# Patient Record
Sex: FEMALE | Race: WHITE | Employment: FULL TIME | URBAN - METROPOLITAN AREA
[De-identification: names, ages, dates, MRNs, and addresses within clinical notes are randomized per-mention and may not be internally consistent; named-entity substitution may affect disease eponyms.]

---

## 2021-01-15 LAB
HEP B, EXTERNAL RESULT: NEGATIVE
HEPATITIS C ANTIBODY, EXTERNAL RESULT: NEGATIVE

## 2021-06-04 LAB
HIV, EXTERNAL RESULT: NORMAL
RPR, EXTERNAL RESULT: NORMAL
RUBELLA TITER, EXTERNAL RESULT: NORMAL

## 2021-07-30 LAB — GBS, EXTERNAL RESULT: NEGATIVE

## 2021-08-17 ENCOUNTER — HOSPITAL ENCOUNTER (INPATIENT)
Age: 26
LOS: 2 days | Discharge: HOME | End: 2021-08-19
Attending: OBSTETRICS & GYNECOLOGY | Admitting: OBSTETRICS & GYNECOLOGY
Payer: COMMERCIAL

## 2021-08-17 PROBLEM — O14.93 PRE-ECLAMPSIA IN THIRD TRIMESTER: Status: ACTIVE | Noted: 2021-08-17

## 2021-08-17 PROBLEM — Z34.90 ENCOUNTER FOR INDUCTION OF LABOR: Status: ACTIVE | Noted: 2021-08-17

## 2021-08-17 PROBLEM — Z3A.38 38 WEEKS GESTATION OF PREGNANCY: Status: ACTIVE | Noted: 2021-08-17

## 2021-08-17 PROBLEM — O09.93 SUPERVISION OF HIGH RISK PREGNANCY IN THIRD TRIMESTER: Status: ACTIVE | Noted: 2021-08-17

## 2021-08-17 LAB
A/G RATIO: 1.1 (ref 1.1–2.2)
ABO/RH: NORMAL
ALBUMIN SERPL-MCNC: 3.4 G/DL (ref 3.4–5)
ALP BLD-CCNC: 140 U/L (ref 40–129)
ALT SERPL-CCNC: 16 U/L (ref 10–40)
ANION GAP SERPL CALCULATED.3IONS-SCNC: 13 MMOL/L (ref 3–16)
ANTIBODY SCREEN: NORMAL
APTT: 26.4 SEC (ref 26.2–38.6)
AST SERPL-CCNC: 20 U/L (ref 15–37)
BASOPHILS ABSOLUTE: 0 K/UL (ref 0–0.2)
BASOPHILS RELATIVE PERCENT: 0.5 %
BILIRUB SERPL-MCNC: <0.2 MG/DL (ref 0–1)
BUN BLDV-MCNC: 8 MG/DL (ref 7–20)
CALCIUM SERPL-MCNC: 8.6 MG/DL (ref 8.3–10.6)
CHLORIDE BLD-SCNC: 105 MMOL/L (ref 99–110)
CO2: 20 MMOL/L (ref 21–32)
CREAT SERPL-MCNC: 0.5 MG/DL (ref 0.6–1.1)
CREATININE URINE: 13.6 MG/DL (ref 28–259)
EOSINOPHILS ABSOLUTE: 0.1 K/UL (ref 0–0.6)
EOSINOPHILS RELATIVE PERCENT: 0.7 %
GFR AFRICAN AMERICAN: >60
GFR NON-AFRICAN AMERICAN: >60
GLOBULIN: 3 G/DL
GLUCOSE BLD-MCNC: 73 MG/DL (ref 70–99)
HCT VFR BLD CALC: 31.7 % (ref 36–48)
HEMOGLOBIN: 10.5 G/DL (ref 12–16)
INR BLD: 0.85 (ref 0.88–1.12)
LYMPHOCYTES ABSOLUTE: 1.8 K/UL (ref 1–5.1)
LYMPHOCYTES RELATIVE PERCENT: 18.9 %
MCH RBC QN AUTO: 26.5 PG (ref 26–34)
MCHC RBC AUTO-ENTMCNC: 33.2 G/DL (ref 31–36)
MCV RBC AUTO: 80 FL (ref 80–100)
MONOCYTES ABSOLUTE: 0.9 K/UL (ref 0–1.3)
MONOCYTES RELATIVE PERCENT: 9 %
NEUTROPHILS ABSOLUTE: 6.8 K/UL (ref 1.7–7.7)
NEUTROPHILS RELATIVE PERCENT: 70.9 %
PDW BLD-RTO: 14 % (ref 12.4–15.4)
PLATELET # BLD: 235 K/UL (ref 135–450)
PMV BLD AUTO: 8.7 FL (ref 5–10.5)
POTASSIUM SERPL-SCNC: 4.2 MMOL/L (ref 3.5–5.1)
PROTEIN PROTEIN: 8 MG/DL
PROTEIN/CREAT RATIO: 0.6 MG/DL
PROTHROMBIN TIME: 9.5 SEC (ref 9.9–12.7)
RBC # BLD: 3.96 M/UL (ref 4–5.2)
SARS-COV-2, NAAT: NOT DETECTED
SODIUM BLD-SCNC: 138 MMOL/L (ref 136–145)
TOTAL PROTEIN: 6.4 G/DL (ref 6.4–8.2)
URIC ACID, SERUM: 4.5 MG/DL (ref 2.6–6)
WBC # BLD: 9.5 K/UL (ref 4–11)

## 2021-08-17 PROCEDURE — 86850 RBC ANTIBODY SCREEN: CPT

## 2021-08-17 PROCEDURE — 85610 PROTHROMBIN TIME: CPT

## 2021-08-17 PROCEDURE — 80053 COMPREHEN METABOLIC PANEL: CPT

## 2021-08-17 PROCEDURE — 85730 THROMBOPLASTIN TIME PARTIAL: CPT

## 2021-08-17 PROCEDURE — 82570 ASSAY OF URINE CREATININE: CPT

## 2021-08-17 PROCEDURE — 1220000000 HC SEMI PRIVATE OB R&B

## 2021-08-17 PROCEDURE — 59025 FETAL NON-STRESS TEST: CPT

## 2021-08-17 PROCEDURE — 84156 ASSAY OF PROTEIN URINE: CPT

## 2021-08-17 PROCEDURE — 85025 COMPLETE CBC W/AUTO DIFF WBC: CPT

## 2021-08-17 PROCEDURE — 84550 ASSAY OF BLOOD/URIC ACID: CPT

## 2021-08-17 PROCEDURE — 80307 DRUG TEST PRSMV CHEM ANLYZR: CPT

## 2021-08-17 PROCEDURE — 6360000002 HC RX W HCPCS: Performed by: OBSTETRICS & GYNECOLOGY

## 2021-08-17 PROCEDURE — 86900 BLOOD TYPING SEROLOGIC ABO: CPT

## 2021-08-17 PROCEDURE — 87635 SARS-COV-2 COVID-19 AMP PRB: CPT

## 2021-08-17 PROCEDURE — 2580000003 HC RX 258: Performed by: OBSTETRICS & GYNECOLOGY

## 2021-08-17 PROCEDURE — 86901 BLOOD TYPING SEROLOGIC RH(D): CPT

## 2021-08-17 RX ORDER — SODIUM CHLORIDE, SODIUM LACTATE, POTASSIUM CHLORIDE, AND CALCIUM CHLORIDE .6; .31; .03; .02 G/100ML; G/100ML; G/100ML; G/100ML
1000 INJECTION, SOLUTION INTRAVENOUS PRN
Status: DISCONTINUED | OUTPATIENT
Start: 2021-08-17 | End: 2021-08-18 | Stop reason: HOSPADM

## 2021-08-17 RX ORDER — SODIUM CHLORIDE, SODIUM LACTATE, POTASSIUM CHLORIDE, AND CALCIUM CHLORIDE .6; .31; .03; .02 G/100ML; G/100ML; G/100ML; G/100ML
500 INJECTION, SOLUTION INTRAVENOUS PRN
Status: DISCONTINUED | OUTPATIENT
Start: 2021-08-17 | End: 2021-08-18 | Stop reason: HOSPADM

## 2021-08-17 RX ORDER — SODIUM CHLORIDE 0.9 % (FLUSH) 0.9 %
5-40 SYRINGE (ML) INJECTION EVERY 12 HOURS SCHEDULED
Status: DISCONTINUED | OUTPATIENT
Start: 2021-08-17 | End: 2021-08-18 | Stop reason: HOSPADM

## 2021-08-17 RX ORDER — SODIUM CHLORIDE 9 MG/ML
25 INJECTION, SOLUTION INTRAVENOUS PRN
Status: DISCONTINUED | OUTPATIENT
Start: 2021-08-17 | End: 2021-08-18 | Stop reason: HOSPADM

## 2021-08-17 RX ORDER — SODIUM CHLORIDE 0.9 % (FLUSH) 0.9 %
5-40 SYRINGE (ML) INJECTION PRN
Status: DISCONTINUED | OUTPATIENT
Start: 2021-08-17 | End: 2021-08-18 | Stop reason: HOSPADM

## 2021-08-17 RX ORDER — SODIUM CHLORIDE, SODIUM LACTATE, POTASSIUM CHLORIDE, CALCIUM CHLORIDE 600; 310; 30; 20 MG/100ML; MG/100ML; MG/100ML; MG/100ML
INJECTION, SOLUTION INTRAVENOUS CONTINUOUS
Status: DISCONTINUED | OUTPATIENT
Start: 2021-08-17 | End: 2021-08-19 | Stop reason: HOSPADM

## 2021-08-17 RX ORDER — ONDANSETRON 4 MG/1
4 TABLET, ORALLY DISINTEGRATING ORAL EVERY 8 HOURS PRN
Status: DISCONTINUED | OUTPATIENT
Start: 2021-08-17 | End: 2021-08-19 | Stop reason: HOSPADM

## 2021-08-17 RX ORDER — DOCUSATE SODIUM 100 MG/1
100 CAPSULE, LIQUID FILLED ORAL 2 TIMES DAILY
Status: DISCONTINUED | OUTPATIENT
Start: 2021-08-17 | End: 2021-08-18 | Stop reason: HOSPADM

## 2021-08-17 RX ORDER — ONDANSETRON 2 MG/ML
4 INJECTION INTRAMUSCULAR; INTRAVENOUS EVERY 6 HOURS PRN
Status: DISCONTINUED | OUTPATIENT
Start: 2021-08-17 | End: 2021-08-19 | Stop reason: HOSPADM

## 2021-08-17 RX ORDER — ACETAMINOPHEN 325 MG/1
650 TABLET ORAL EVERY 4 HOURS PRN
Status: DISCONTINUED | OUTPATIENT
Start: 2021-08-17 | End: 2021-08-19 | Stop reason: HOSPADM

## 2021-08-17 RX ADMIN — Medication 1 MILLI-UNITS/MIN: at 18:32

## 2021-08-17 RX ADMIN — SODIUM CHLORIDE, POTASSIUM CHLORIDE, SODIUM LACTATE AND CALCIUM CHLORIDE: 600; 310; 30; 20 INJECTION, SOLUTION INTRAVENOUS at 18:24

## 2021-08-17 ASSESSMENT — PAIN DESCRIPTION - DESCRIPTORS: DESCRIPTORS: CRAMPING

## 2021-08-17 NOTE — H&P
Department of Obstetrics and Gynecology   Obstetrics History and Physical        CHIEF COMPLAINT:  Elevated blood pressurs    HISTORY OF PRESENT ILLNESS:    Quoc Sheppard  is a 27 y.o.  female at 38w4d presents from office for new onset elevated blood pressures. UPCR 0.6, meeting criteria for preeclampsia. On ROS, she denies headache, vision changes, RUQ pain, n/v. She reports rare contractions. She endorses good fetal movement. She denies VB and LOF. 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  Pre-eclampsia in third trimester 2021     Past Surgical History:        Procedure Laterality Date    TUMOR REMOVAL Left     age 1 mos    WISDOM TOOTH EXTRACTION       Allergies:  Patient has no known allergies.   Social History:    Social History     Socioeconomic History    Marital status:      Spouse name: Not on file    Number of children: Not on file    Years of education: Not on file    Highest education level: Not on file   Occupational History    Not on file   Tobacco Use    Smoking status: Never Smoker    Smokeless tobacco: Never Used   Vaping Use    Vaping Use: Never used   Substance and Sexual Activity    Alcohol use: Not Currently    Drug use: Never    Sexual activity: Yes     Partners: Male   Other Topics Concern    Not on file   Social History Narrative    Not on file     Social Determinants of Health     Financial Resource Strain:     Difficulty of Paying Living Expenses:    Food Insecurity:     Worried About Running Out of Food in the Last Year:     Ran Out of Food in the Last Year:    Transportation Needs:     Lack of Transportation (Medical):  Lack of Transportation (Non-Medical):    Physical Activity:     Days of Exercise per Week:     Minutes of Exercise per Session:    Stress:     Feeling of Stress :    Social Connections:     Frequency of Communication with Friends and Family:     Frequency of Social Gatherings with Friends and Family:     Attends Gnosticism Services:     Active Member of Clubs or Organizations:     Attends Club or Organization Meetings:     Marital Status:    Intimate Partner Violence:     Fear of Current or Ex-Partner:     Emotionally Abused:     Physically Abused:     Sexually Abused:      Family History:       Problem Relation Age of Onset    No Known Problems Mother     No Known Problems Father      Medications Prior to Admission:  Medications Prior to Admission: Prenatal Vit-Fe Fumarate-FA (PRENATAL VITAMINS PO), Take 1 tablet by mouth daily    REVIEW OF SYSTEMS:  negative     PHYSICAL EXAM:    Vitals:    08/17/21 1629 08/17/21 1731 08/17/21 1830 08/17/21 1900   BP: (!) 140/86 (!) 136/90 (!) 154/90 (!) 136/93   Pulse: 89 88 72 88   Resp: 20 16     Temp:  98.4 °F (36.9 °C)     SpO2:  97%     Weight:       Height:         General appearance:  awake, alert, cooperative, no apparent distress, and appears stated age  Neurologic:  Awake, alert, oriented to name, place and time.     Lungs:  No increased work of breathing, good air exchange  Abdomen:  Soft, non tender, gravid, fundal height consistent with the gestational age, EFW by Leopold's maneuvers is 3200g  Pelvis:  Adequate pelvis  Cervix: 3/50/-3, AROM performed for clear fluid  Contraction frequency: every 2-3 minutes  Labs:   CBC:   Lab Results   Component Value Date    WBC 9.5 08/17/2021    RBC 3.96 08/17/2021    HGB 10.5 08/17/2021    HCT 31.7 08/17/2021    MCV 80.0 08/17/2021    MCH 26.5 08/17/2021    MCHC 33.2 08/17/2021    RDW 14.0 08/17/2021  08/17/2021    MPV 8.7 08/17/2021     CMP:    Lab Results   Component Value Date     08/17/2021    K 4.2 08/17/2021     08/17/2021    CO2 20 08/17/2021    BUN 8 08/17/2021    CREATININE 0.5 08/17/2021    GFRAA >60 08/17/2021    AGRATIO 1.1 08/17/2021    LABGLOM >60 08/17/2021    GLUCOSE 73 08/17/2021    PROT 6.4 08/17/2021    LABALBU 3.4 08/17/2021    CALCIUM 8.6 08/17/2021    BILITOT <0.2 08/17/2021    ALKPHOS 140 08/17/2021    AST 20 08/17/2021    ALT 16 08/17/2021     UPCR 0.6    ASSESSMENT:  Supervision of high risk pregnancy in third trimester     Patient Active Problem List    Diagnosis Date Noted    Encounter for induction of labor 08/17/2021    Supervision of high risk pregnancy in third trimester 08/17/2021    Pre-eclampsia in third trimester 08/17/2021    38 weeks gestation of pregnancy 08/17/2021      Meets criteria for preeclampsia with elevated blood pressure and UPCR 0.6. No severe features. - Admit for IOL. - Contractions on toco, some felt by patient. Pit started 2x2. AROM performed for clear fluid. - GBS negative. - Epidural if desired    PLAN:   Labor: Routine labor orders  Fetus: Reassuring  GBS: Negative    I have presented reasonable alternatives to the patient's proposed care, treatment, and services. The discussion I have done encompassed risks, benefits, and side effects related to the alternatives and the risks related to not receiving the proposed care, treatment, and services. All questions answered. Patient wishes to proceed.     Electronically signed by Fartun Gallardo MD on 8/17/2021 at 7:38 PM

## 2021-08-17 NOTE — PROGRESS NOTES
Pt to L&D triage A c/o high BP in MD office. Pt states she went to MD appt due to decreased fetal movement. But BP was elevated at visit. EFM & toco applied. VS obtained.

## 2021-08-17 NOTE — FLOWSHEET NOTE
08/17/21 1610   Labor & Delivery Summary   Is patient in active labor? No   Fetal Heart Rate   Mode External US   Baseline Rate 135 bpm   Baseline Classification Normal   Variability 6-25 BPM   Pattern Accelerations   Patient Feels Fetal Movement Yes   Interventions RN at Bedside   OB Bladder Status Non-distended   Fetal Monitoring Strip   FMS Reviewed? Yes   FMS Reviewed By? LLapeRNC   Uterine Activity   Mode Palpation; [place]   Contraction Frequency 0   Resting Tone Palpated Soft   Membrane/Amniotic Fluid   Membrane Status Intact   Vaginal Drainage   Mucous No   Vaginal Bleeding   Vaginal Bleeding?  No

## 2021-08-18 ENCOUNTER — ANESTHESIA (OUTPATIENT)
Dept: LABOR AND DELIVERY | Age: 26
End: 2021-08-18
Payer: COMMERCIAL

## 2021-08-18 ENCOUNTER — ANESTHESIA EVENT (OUTPATIENT)
Dept: LABOR AND DELIVERY | Age: 26
End: 2021-08-18
Payer: COMMERCIAL

## 2021-08-18 LAB
AMPHETAMINE SCREEN, URINE: NORMAL
BARBITURATE SCREEN URINE: NORMAL
BENZODIAZEPINE SCREEN, URINE: NORMAL
BUPRENORPHINE URINE: NORMAL
CANNABINOID SCREEN URINE: NORMAL
COCAINE METABOLITE SCREEN URINE: NORMAL
Lab: NORMAL
METHADONE SCREEN, URINE: NORMAL
OPIATE SCREEN URINE: NORMAL
OXYCODONE URINE: NORMAL
PH UA: 7
PHENCYCLIDINE SCREEN URINE: NORMAL
PROPOXYPHENE SCREEN: NORMAL

## 2021-08-18 PROCEDURE — 1220000000 HC SEMI PRIVATE OB R&B

## 2021-08-18 PROCEDURE — 59050 FETAL MONITOR W/REPORT: CPT

## 2021-08-18 PROCEDURE — 59025 FETAL NON-STRESS TEST: CPT

## 2021-08-18 PROCEDURE — 6370000000 HC RX 637 (ALT 250 FOR IP): Performed by: OBSTETRICS & GYNECOLOGY

## 2021-08-18 PROCEDURE — 7200000001 HC VAGINAL DELIVERY

## 2021-08-18 PROCEDURE — 2500000003 HC RX 250 WO HCPCS

## 2021-08-18 PROCEDURE — 51701 INSERT BLADDER CATHETER: CPT

## 2021-08-18 PROCEDURE — 3E033VJ INTRODUCTION OF OTHER HORMONE INTO PERIPHERAL VEIN, PERCUTANEOUS APPROACH: ICD-10-PCS | Performed by: OBSTETRICS & GYNECOLOGY

## 2021-08-18 PROCEDURE — 6360000002 HC RX W HCPCS: Performed by: ANESTHESIOLOGY

## 2021-08-18 PROCEDURE — 2580000003 HC RX 258: Performed by: OBSTETRICS & GYNECOLOGY

## 2021-08-18 PROCEDURE — 2500000003 HC RX 250 WO HCPCS: Performed by: NURSE ANESTHETIST, CERTIFIED REGISTERED

## 2021-08-18 PROCEDURE — 10907ZC DRAINAGE OF AMNIOTIC FLUID, THERAPEUTIC FROM PRODUCTS OF CONCEPTION, VIA NATURAL OR ARTIFICIAL OPENING: ICD-10-PCS | Performed by: OBSTETRICS & GYNECOLOGY

## 2021-08-18 PROCEDURE — 3700000025 EPIDURAL BLOCK: Performed by: ANESTHESIOLOGY

## 2021-08-18 PROCEDURE — 6360000002 HC RX W HCPCS: Performed by: OBSTETRICS & GYNECOLOGY

## 2021-08-18 RX ORDER — NALBUPHINE HCL 10 MG/ML
5 AMPUL (ML) INJECTION EVERY 4 HOURS PRN
Status: DISCONTINUED | OUTPATIENT
Start: 2021-08-18 | End: 2021-08-18 | Stop reason: HOSPADM

## 2021-08-18 RX ORDER — LIDOCAINE HYDROCHLORIDE 10 MG/ML
INJECTION, SOLUTION INFILTRATION; PERINEURAL PRN
Status: DISCONTINUED | OUTPATIENT
Start: 2021-08-18 | End: 2021-08-18 | Stop reason: SDUPTHER

## 2021-08-18 RX ORDER — NALOXONE HYDROCHLORIDE 0.4 MG/ML
0.4 INJECTION, SOLUTION INTRAMUSCULAR; INTRAVENOUS; SUBCUTANEOUS PRN
Status: DISCONTINUED | OUTPATIENT
Start: 2021-08-18 | End: 2021-08-18 | Stop reason: HOSPADM

## 2021-08-18 RX ORDER — BUPIVACAINE HYDROCHLORIDE 2.5 MG/ML
INJECTION, SOLUTION EPIDURAL; INFILTRATION; INTRACAUDAL PRN
Status: DISCONTINUED | OUTPATIENT
Start: 2021-08-18 | End: 2021-08-18 | Stop reason: SDUPTHER

## 2021-08-18 RX ORDER — IBUPROFEN 800 MG/1
800 TABLET ORAL EVERY 8 HOURS PRN
Status: DISCONTINUED | OUTPATIENT
Start: 2021-08-18 | End: 2021-08-19 | Stop reason: HOSPADM

## 2021-08-18 RX ORDER — DOCUSATE SODIUM 100 MG/1
100 CAPSULE, LIQUID FILLED ORAL 2 TIMES DAILY
Status: DISCONTINUED | OUTPATIENT
Start: 2021-08-18 | End: 2021-08-19 | Stop reason: HOSPADM

## 2021-08-18 RX ORDER — SODIUM CHLORIDE 0.9 % (FLUSH) 0.9 %
5-40 SYRINGE (ML) INJECTION PRN
Status: DISCONTINUED | OUTPATIENT
Start: 2021-08-18 | End: 2021-08-19 | Stop reason: HOSPADM

## 2021-08-18 RX ORDER — LANOLIN 100 %
OINTMENT (GRAM) TOPICAL PRN
Status: DISCONTINUED | OUTPATIENT
Start: 2021-08-18 | End: 2021-08-19 | Stop reason: HOSPADM

## 2021-08-18 RX ORDER — ONDANSETRON 2 MG/ML
4 INJECTION INTRAMUSCULAR; INTRAVENOUS EVERY 6 HOURS PRN
Status: DISCONTINUED | OUTPATIENT
Start: 2021-08-18 | End: 2021-08-18 | Stop reason: HOSPADM

## 2021-08-18 RX ORDER — IBUPROFEN 800 MG/1
800 TABLET ORAL EVERY 8 HOURS
Status: DISCONTINUED | OUTPATIENT
Start: 2021-08-19 | End: 2021-08-18

## 2021-08-18 RX ORDER — LIDOCAINE HYDROCHLORIDE AND EPINEPHRINE 20; 5 MG/ML; UG/ML
INJECTION, SOLUTION EPIDURAL; INFILTRATION; INTRACAUDAL; PERINEURAL PRN
Status: DISCONTINUED | OUTPATIENT
Start: 2021-08-18 | End: 2021-08-18 | Stop reason: SDUPTHER

## 2021-08-18 RX ORDER — SODIUM CHLORIDE 0.9 % (FLUSH) 0.9 %
5-40 SYRINGE (ML) INJECTION EVERY 12 HOURS SCHEDULED
Status: DISCONTINUED | OUTPATIENT
Start: 2021-08-18 | End: 2021-08-19 | Stop reason: HOSPADM

## 2021-08-18 RX ORDER — OXYCODONE HYDROCHLORIDE AND ACETAMINOPHEN 5; 325 MG/1; MG/1
1 TABLET ORAL EVERY 4 HOURS PRN
Status: DISCONTINUED | OUTPATIENT
Start: 2021-08-18 | End: 2021-08-19 | Stop reason: HOSPADM

## 2021-08-18 RX ORDER — SODIUM CHLORIDE 9 MG/ML
25 INJECTION, SOLUTION INTRAVENOUS PRN
Status: DISCONTINUED | OUTPATIENT
Start: 2021-08-18 | End: 2021-08-19 | Stop reason: HOSPADM

## 2021-08-18 RX ORDER — ACETAMINOPHEN 325 MG/1
650 TABLET ORAL EVERY 4 HOURS PRN
Status: DISCONTINUED | OUTPATIENT
Start: 2021-08-18 | End: 2021-08-19 | Stop reason: HOSPADM

## 2021-08-18 RX ORDER — LIDOCAINE HYDROCHLORIDE AND EPINEPHRINE 15; 5 MG/ML; UG/ML
INJECTION, SOLUTION EPIDURAL PRN
Status: DISCONTINUED | OUTPATIENT
Start: 2021-08-18 | End: 2021-08-18 | Stop reason: SDUPTHER

## 2021-08-18 RX ORDER — BUPIVACAINE HYDROCHLORIDE 5 MG/ML
INJECTION, SOLUTION EPIDURAL; INTRACAUDAL PRN
Status: DISCONTINUED | OUTPATIENT
Start: 2021-08-18 | End: 2021-08-18 | Stop reason: SDUPTHER

## 2021-08-18 RX ORDER — SODIUM CHLORIDE, SODIUM LACTATE, POTASSIUM CHLORIDE, CALCIUM CHLORIDE 600; 310; 30; 20 MG/100ML; MG/100ML; MG/100ML; MG/100ML
INJECTION, SOLUTION INTRAVENOUS CONTINUOUS
Status: DISCONTINUED | OUTPATIENT
Start: 2021-08-18 | End: 2021-08-19 | Stop reason: HOSPADM

## 2021-08-18 RX ADMIN — ACETAMINOPHEN 650 MG: 325 TABLET ORAL at 20:49

## 2021-08-18 RX ADMIN — ACETAMINOPHEN 650 MG: 325 TABLET ORAL at 14:11

## 2021-08-18 RX ADMIN — Medication 87.3 MILLI-UNITS/MIN: at 12:07

## 2021-08-18 RX ADMIN — Medication 12 ML/HR: at 01:49

## 2021-08-18 RX ADMIN — IBUPROFEN 800 MG: 800 TABLET, FILM COATED ORAL at 17:39

## 2021-08-18 RX ADMIN — SODIUM CHLORIDE, POTASSIUM CHLORIDE, SODIUM LACTATE AND CALCIUM CHLORIDE: 600; 310; 30; 20 INJECTION, SOLUTION INTRAVENOUS at 11:31

## 2021-08-18 RX ADMIN — SODIUM CHLORIDE, POTASSIUM CHLORIDE, SODIUM LACTATE AND CALCIUM CHLORIDE: 600; 310; 30; 20 INJECTION, SOLUTION INTRAVENOUS at 02:30

## 2021-08-18 RX ADMIN — BUPIVACAINE HYDROCHLORIDE 10 MG: 2.5 INJECTION, SOLUTION EPIDURAL; INFILTRATION; INTRACAUDAL at 01:42

## 2021-08-18 RX ADMIN — LIDOCAINE HYDROCHLORIDE 3 ML: 10 INJECTION, SOLUTION INFILTRATION; PERINEURAL at 01:32

## 2021-08-18 RX ADMIN — LIDOCAINE HYDROCHLORIDE AND EPINEPHRINE 3 ML: 15; 5 INJECTION, SOLUTION EPIDURAL at 01:35

## 2021-08-18 RX ADMIN — BUPIVACAINE HYDROCHLORIDE 6 ML: 5 INJECTION, SOLUTION EPIDURAL; INTRACAUDAL at 10:51

## 2021-08-18 RX ADMIN — LIDOCAINE HYDROCHLORIDE,EPINEPHRINE BITARTRATE 7 ML: 20; .005 INJECTION, SOLUTION EPIDURAL; INFILTRATION; INTRACAUDAL; PERINEURAL at 11:54

## 2021-08-18 RX ADMIN — ONDANSETRON 4 MG: 2 INJECTION INTRAMUSCULAR; INTRAVENOUS at 08:38

## 2021-08-18 RX ADMIN — Medication: at 12:07

## 2021-08-18 RX ADMIN — DOCUSATE SODIUM 100 MG: 100 CAPSULE ORAL at 14:11

## 2021-08-18 ASSESSMENT — PAIN SCALES - GENERAL
PAINLEVEL_OUTOF10: 4
PAINLEVEL_OUTOF10: 2
PAINLEVEL_OUTOF10: 0
PAINLEVEL_OUTOF10: 4

## 2021-08-18 ASSESSMENT — PAIN DESCRIPTION - DESCRIPTORS
DESCRIPTORS: PRESSURE
DESCRIPTORS: DISCOMFORT
DESCRIPTORS: PRESSURE

## 2021-08-18 ASSESSMENT — PAIN - FUNCTIONAL ASSESSMENT: PAIN_FUNCTIONAL_ASSESSMENT: ACTIVITIES ARE NOT PREVENTED

## 2021-08-18 ASSESSMENT — PAIN DESCRIPTION - LOCATION: LOCATION: PERINEUM

## 2021-08-18 ASSESSMENT — PAIN DESCRIPTION - PROGRESSION: CLINICAL_PROGRESSION: GRADUALLY IMPROVING

## 2021-08-18 ASSESSMENT — PAIN DESCRIPTION - PAIN TYPE: TYPE: ACUTE PAIN

## 2021-08-18 NOTE — FLOWSHEET NOTE
08/17/21 1900   Fetal Heart Rate   Mode External US   Baseline Rate 125 bpm   Baseline Classification Normal   Variability 6-25 BPM   Pattern Accelerations   Patient Feels Fetal Movement Yes   Interventions RN at Bedside   OB Bladder Status Non-distended   Fetal Monitoring Strip   FMS Reviewed? Yes   FMS Reviewed By? SW   Uterine Activity   Mode Palpation; [place]   Contraction Frequency 1.5-5   Contraction Duration 50-80   Contraction Quality Moderate   Resting Tone Palpated Soft

## 2021-08-18 NOTE — FLOWSHEET NOTE
Dr Lindsey Staley aware pt in 9/100/0 feeling some pressure with cx. Variables and early decels noted. Position changes and amnio infusion infusing. Will cont to estrada.

## 2021-08-18 NOTE — FLOWSHEET NOTE
Dr. Richards Army called and updated on Cat II FHT despite repositioning, SVE 7/80/-2. Order received for IUPC with amnioinfusion. This RN will continue to monitor pt and update provider.

## 2021-08-18 NOTE — FLOWSHEET NOTE
Dr Diana Dean at bedside, SVE:complete/100/+2 occiput transverse. Pt started pushing. Dr. Diana Dean attempting to rotate. RN to continue pushing with pt. RN remains in continuous attendance at bedside with pt. Assessment and evaluation of FHR ongoing via continuous EFM.

## 2021-08-18 NOTE — L&D DELIVERY SUMMARY NOTE
Department of Obstetrics and Gynecology  Spontaneous Vaginal Delivery Note      Pre-operative Diagnosis:  Term pregnancy, GTHN    Post-operative Diagnosis:  Male    Information for the patient's :  Saurabh Wise [[id number]]                    Infant Wt:   Information for the patient's :  Sauarbh Wise [[id number]]           APGARS:     Information for the patient's :  Saurabh Wise [[id number]]           Anesthesia:  epidural anesthesia    Application and Delivery:  IOL for GTHN at 38 4/7 weeks, pitocin, AROM, progressed well to complete. Pushed 4 hours for  over MLE, viable male, nuchal x 1, skin to skin. Placenta spont intact, uterus explored. Repair with 2-0,3-0 vicryl. Delivery Summary:  Labor & Delivery Summary  Dilation Complete Date: 21  Dilation Complete Time: 801    Specimen:  Placenta sent to pathology     Intake/Output:     Date 21 0701 - 21 0700 21 0701 - 21 0700   Shift 2108-2488 9084-4659 9818-9946 24 Hour Total 1073-8872 0502-2265 5604-2779 24 Hour Total   INTAKE   I.V.   40.9 40.9 2883.8   2883.8   Shift Total   40.9 40.9 2883.8   2883.8   OUTPUT   Urine   700 700 150   150   Shift Total   700 700 150   150   NET   -659.2 -659.2 2733.8   2733.8       Condition:  infant stable to general nursery    Blood Type and Rh: A POS        Rubella Immunity Status:   Immune           Infant Feeding:    breast    Attending Attestation: I was present and scrubbed for the entire procedure.

## 2021-08-18 NOTE — FLOWSHEET NOTE
Report from [address] at bedside. Pitocin verified together at 6 miu. Pt doing well. On far left side with peanut ball.

## 2021-08-18 NOTE — FLOWSHEET NOTE
Patient's desires to get up to bathroom. Able to raise legs off bed. Pt assisted to sitting on side of bed to dangle- Denies dizziness/lightheadedness. Pt up x 2 assist w/o difficulty to bathroom. Patient voided 200 ml blood tinged urine. Pericare taught and demonstration returned. Patient informed on postpartum pericare and instructions given of use of tucks pads and dermaplast spray. Epidural removed at this time. Tip intact. No issues pt tolerated well. Gown changed. Patient to wheelchair, transferred to [facility] [id number]. Postpartum education given, whiteboard updated, call light within reach. Pt without needs at this time.

## 2021-08-18 NOTE — ANESTHESIA PROCEDURE NOTES
Epidural Block    Patient location during procedure: OB  Start time: 8/18/2021 1:30 AM  End time: 8/18/2021 1:45 AM  Reason for block: labor epidural  Staffing  Resident/CRNA: Gregoria Brennan [facility], APRN - CRNA  Preanesthetic Checklist  Completed: patient identified, IV checked, site marked, risks and benefits discussed, surgical consent, monitors and equipment checked, pre-op evaluation, timeout performed, anesthesia consent given, oxygen available and patient being monitored  Epidural  Patient position: sitting  Prep: ChloraPrep  Patient monitoring: cardiac monitor, continuous pulse ox and frequent blood pressure checks  Approach: midline  Location: lumbar (1-5)  Injection technique: MARÍA ELENA air  Guidance: paresthesia technique  Provider prep: mask and sterile gloves  Needle  Needle type: Tuohy   Needle gauge: 17 G  Needle length: 3.5 in  Needle insertion depth: 5 cm  Catheter type: side hole  Catheter size: 19 G  Catheter at skin depth: 11 cm  Test dose: negative  Assessment  Sensory level: T8  Hemodynamics: stable  Attempts: 1

## 2021-08-18 NOTE — ANESTHESIA PRE PROCEDURE
Department of Anesthesiology  Preprocedure Note       Name:  Monica Vizcaino   Age:  27 y. o.  :  1995                                          MRN:  [id number]         Date:  2021      Surgeon: * No surgeons listed *    Procedure: * No procedures listed *    Medications prior to admission:   Prior to Admission medications    Medication Sig Start Date End Date Taking?  Authorizing Provider   Prenatal Vit-Fe Fumarate-FA (PRENATAL VITAMINS PO) Take 1 tablet by mouth daily   Yes Historical Provider, MD       Current medications:    Current Facility-Administered Medications   Medication Dose Route Frequency Provider Last Rate Last Admin    ondansetron (ZOFRAN-ODT) disintegrating tablet 4 mg  4 mg Oral Q8H PRN Zahida Steel MD        Or    ondansetron [facility]) injection 4 mg  4 mg Intravenous Q6H PRN Zahida Steel MD        acetaminophen (TYLENOL) tablet 650 mg  650 mg Oral Q4H PRN Zahida Steel MD        lactated ringers infusion   Intravenous Continuous Zahida Steel  mL/hr at 21 1824 New Bag at 21 1824    lactated ringers bolus  500 mL Intravenous PRN Zahida Steel MD        Or    lactated ringers bolus  1,000 mL Intravenous PRN Zahida Steel MD        sodium chloride flush 0.9 % injection 5-40 mL  5-40 mL Intravenous 2 times per day Zahida Steel MD        sodium chloride flush 0.9 % injection 5-40 mL  5-40 mL Intravenous PRN Zahida Steel MD        0.9 % sodium chloride infusion  25 mL Intravenous PRN Zahida Steel MD        benzocaine-menthol (DERMOPLAST) 20-0.5 % spray   Topical PRN Zahida Steel MD        docusate sodium (COLACE) capsule 100 mg  100 mg Oral BID Zahida Steel MD        oxytocin (PITOCIN) 30 units in 500 mL infusion  1-20 roman-units/min Intravenous Continuous Zahida Steel MD 4 mL/hr at 21 0030 4 roman-units/min at 21 0030    oxytocin (PITOCIN) 30 units in 500 mL infusion  10 Units Intravenous PRN Zahida Steel MD And    oxytocin (PITOCIN) 30 units in 500 mL infusion  87.3 roman-units/min Intravenous Continuous PRN Benoit Lorenzo MD         Facility-Administered Medications Ordered in Other Encounters   Medication Dose Route Frequency Provider Last Rate Last Admin    fentaNYL 3 mcg/ml bupivacaine 0.125% in sodium chloride 0.9% 100 mL epidural   Epidural Continuous PRN [place] Chroman, APRN - CRNA 12 mL/hr at 08/18/21 0149 12 mL/hr at 08/18/21 0149    lidocaine 1 % injection   Intradermal PRN [place] Chroman, APRN - CRNA   3 mL at 08/18/21 0132    Lidocaine-EPINEPHrine 1.5 %-1:263702   Epidural PRN Gustavo Antu Chu Alcaraz, APRN - CRNA   3 mL at 08/18/21 0135    bupivacaine (PF) (MARCAINE) 0.25 % injection   Epidural PRN Gustavo Antu Downey Alcaraz, APRN - CRNA   10 mg at 08/18/21 0142       Allergies:  No Known Allergies    Problem List:    Patient Active Problem List   Diagnosis Code    Encounter for induction of labor Z34.90    Supervision of high risk pregnancy in third trimester O09.93    Pre-eclampsia in third trimester O14.93    38 weeks gestation of pregnancy Z3A.38       Past Medical History:        Diagnosis Date    Hypertension     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      Pre-eclampsia in third trimester 8/17/2021       Past Surgical History:        Procedure Laterality Date    TUMOR REMOVAL Left 1997    age 1 mos    WISDOM TOOTH EXTRACTION  2013       Social History:    Social History     Tobacco Use    Smoking status: Never Smoker    Smokeless tobacco: Never Used   Substance Use Topics    Alcohol use: Not Currently                                Counseling given: Not Answered      Vital Signs (Current):   Vitals:    08/17/21 1900 08/17/21 2030 08/17/21 2230 08/18/21 0000   BP: (!) 136/93 (!) 150/85 137/86 137/81   Pulse: 88 69 68 88   Resp:  16 16 20   Temp:   36.9 °C (98.5 °F)    SpO2:       Weight: Height:                                                  BP Readings from Last 3 Encounters:   08/18/21 137/81       NPO Status: Time of last liquid consumption: 1530                        Time of last solid consumption: 1130                        Date of last liquid consumption: 08/17/21                        Date of last solid food consumption: 08/17/21    BMI:   Wt Readings from Last 3 Encounters:   08/17/21 176 lb 12.8 oz (80.2 kg)     Body mass index is 31.32 kg/m². CBC:   Lab Results   Component Value Date    WBC 9.5 08/17/2021    RBC 3.96 08/17/2021    HGB 10.5 08/17/2021    HCT 31.7 08/17/2021    MCV 80.0 08/17/2021    RDW 14.0 08/17/2021     08/17/2021       CMP:   Lab Results   Component Value Date     08/17/2021    K 4.2 08/17/2021     08/17/2021    CO2 20 08/17/2021    BUN 8 08/17/2021    CREATININE 0.5 08/17/2021    GFRAA >60 08/17/2021    AGRATIO 1.1 08/17/2021    LABGLOM >60 08/17/2021    GLUCOSE 73 08/17/2021    PROT 6.4 08/17/2021    CALCIUM 8.6 08/17/2021    BILITOT <0.2 08/17/2021    ALKPHOS 140 08/17/2021    AST 20 08/17/2021    ALT 16 08/17/2021       POC Tests: No results for input(s): POCGLU, POCNA, POCK, POCCL, POCBUN, POCHEMO, POCHCT in the last 72 hours.     Coags:   Lab Results   Component Value Date    PROTIME 9.5 08/17/2021    INR 0.85 08/17/2021    APTT 26.4 08/17/2021       HCG (If Applicable): No results found for: PREGTESTUR, PREGSERUM, HCG, HCGQUANT     ABGs: No results found for: PHART, PO2ART, RRW5EBD, LQG3VQZ, BEART, M2QJRFNQ     Type & Screen (If Applicable):  No results found for: LABABO, LABRH    Drug/Infectious Status (If Applicable):  No results found for: HIV, HEPCAB    COVID-19 Screening (If Applicable):   Lab Results   Component Value Date    COVID19 Not Detected 08/17/2021           Anesthesia Evaluation    Airway: Mallampati: II  TM distance: >3 FB   Neck ROM: full  Mouth opening: > = 3 FB Dental: normal exam         Pulmonary:Negative Pulmonary ROS and normal exam                               Cardiovascular:    (+) hypertension: severe and moderate,          Beta Blocker:  Not on Beta Blocker         Neuro/Psych:   Negative Neuro/Psych ROS              GI/Hepatic/Renal: Neg GI/Hepatic/Renal ROS            Endo/Other: Negative Endo/Other ROS                    Abdominal:             Vascular: negative vascular ROS. Other Findings: Pre-eclamptic            Anesthesia Plan      epidural     ASA 3             Anesthetic plan and risks discussed with patient. Use of blood products discussed with patient whom consented to blood products. [facility] Department of Anesthesiology  Pre-Anesthesia Evaluation/Consultation       Name:  Steven Morris                                         Age:  27 y.o. MRN:  [id number]           Procedure (Scheduled):  Labor Epidural  Surgeon:  Dr. 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  Pre-eclampsia in third trimester 8/17/2021     Past Surgical History:   Procedure Laterality Date    TUMOR REMOVAL Left 1997    age 1 mos    [address] EXTRACTION  2013     Social History     Tobacco Use    Smoking status: Never Smoker    Smokeless tobacco: Never Used   Vaping Use    Vaping Use: Never used   Substance Use Topics    Alcohol use: Not Currently    Drug use: Never     Medications  No current facility-administered medications on file prior to encounter.      Current Outpatient Medications on File Prior to Encounter   Medication Sig Dispense Refill    Prenatal Vit-Fe Fumarate-FA (PRENATAL VITAMINS PO) Take 1 tablet by mouth daily Current Facility-Administered Medications   Medication Dose Route Frequency Provider Last Rate Last Admin    ondansetron (ZOFRAN-ODT) disintegrating tablet 4 mg  4 mg Oral Q8H PRN Zach Haro MD        Or    ondansetron [facility]) injection 4 mg  4 mg Intravenous Q6H PRN Zach Haro MD        acetaminophen (TYLENOL) tablet 650 mg  650 mg Oral Q4H PRN Zach Haro MD        lactated ringers infusion   Intravenous Continuous Zach Haro  mL/hr at 08/17/21 1824 New Bag at 08/17/21 1824    lactated ringers bolus  500 mL Intravenous PRN Zach Haro MD        Or    lactated ringers bolus  1,000 mL Intravenous PRN Zach Haro MD        sodium chloride flush 0.9 % injection 5-40 mL  5-40 mL Intravenous 2 times per day Zach Haro MD        sodium chloride flush 0.9 % injection 5-40 mL  5-40 mL Intravenous PRN Zach Haro MD        0.9 % sodium chloride infusion  25 mL Intravenous PRN Zach Haro MD        benzocaine-menthol (DERMOPLAST) 20-0.5 % spray   Topical PRN Zach Haro MD        docusate sodium (COLACE) capsule 100 mg  100 mg Oral BID Zach Haro MD        oxytocin (PITOCIN) 30 units in 500 mL infusion  1-20 roman-units/min Intravenous Continuous Zach Haro MD 4 mL/hr at 08/18/21 0030 4 roman-units/min at 08/18/21 0030    oxytocin (PITOCIN) 30 units in 500 mL infusion  10 Units Intravenous PRN Zach Haro MD        And    oxytocin (PITOCIN) 30 units in 500 mL infusion  87.3 roman-units/min Intravenous Continuous PRN Zach Haro MD         Facility-Administered Medications Ordered in Other Encounters   Medication Dose Route Frequency Provider Last Rate Last Admin    fentaNYL 3 mcg/ml bupivacaine 0.125% in sodium chloride 0.9% 100 mL epidural   Epidural Continuous PRN CHICHO Piedra - CRNA 12 mL/hr at 08/18/21 0149 12 mL/hr at 08/18/21 0149    lidocaine 1 % injection   Intradermal PRN CHICHO Piedra - CRNA   3 mL at 21 0132    Lidocaine-EPINEPHrine 1.5 %-1:019922   Epidural PRN [facility], APRN - CRNA   3 mL at 21 0135    bupivacaine (PF) (MARCAINE) 0.25 % injection   Epidural PRN [facility], APRN - CRNA   10 mg at 21 0142     Vital Signs (Current)   Vitals:    21 0000   BP: 137/81   Pulse: 88   Resp: 20   Temp:    SpO2:      Vital Signs Statistics (for past 48 hrs)     Temp  Av.1 °C (98.7 °F)  Min: 36.9 °C (98.4 °F)   Min taken time: 21  Max: 37.4 °C (99.3 °F)   Max taken time: 21 1549  Pulse  Av.9  Min: 76   Min taken time: 21 223  Max: 89   Max taken time: 21 1629  Resp  Av.7  Min: 12   Min taken time: 21  Max: 20   Max taken time: 21 0000  BP  Min: 136/93   Min taken time: 21 1900  Max: 154/90   Max taken time: 21 1830  SpO2  Av %  Min: 97 %   Min taken time: 21  Max: 97 %   Max taken time: 21 173    BP Readings from Last 3 Encounters:   21 137/81     BMI  Body mass index is 31.32 kg/m². Estimated body mass index is 31.32 kg/m² as calculated from the following:    Height as of this encounter: 5' 3\" (1.6 m). Weight as of this encounter: 176 lb 12.8 oz (80.2 kg).     CBC   Lab Results   Component Value Date    WBC 9.5 2021    RBC 3.96 2021    HGB 10.5 2021    HCT 31.7 2021    MCV 80.0 2021    RDW 14.0 2021     2021     CMP    Lab Results   Component Value Date     2021    K 4.2 2021     2021    CO2 20 2021    BUN 8 2021    CREATININE 0.5 2021    GFRAA >60 2021    AGRATIO 1.1 2021    LABGLOM >60 2021    GLUCOSE 73 2021    PROT 6.4 2021    CALCIUM 8.6 2021    BILITOT <0.2 2021    ALKPHOS 140 2021    AST 20 2021    ALT 16 2021     BMP    Lab Results   Component Value Date     2021 K 4.2 08/17/2021     08/17/2021    CO2 20 08/17/2021    BUN 8 08/17/2021    CREATININE 0.5 08/17/2021    CALCIUM 8.6 08/17/2021    GFRAA >60 08/17/2021    LABGLOM >60 08/17/2021    GLUCOSE 73 08/17/2021     POCGlucose  Recent Labs     08/17/21  1640   GLUCOSE 73      Coags    Lab Results   Component Value Date    PROTIME 9.5 08/17/2021    INR 0.85 08/17/2021    APTT 26.4 88/53/9819     HCG (If Applicable) No results found for: PREGTESTUR, PREGSERUM, HCG, HCGQUANT   ABGs No results found for: PHART, PO2ART, RGW5VUF, TTS8CYU, BEART, M3DGQOBH   Type & Screen (If Applicable)  No results found for: Anastasia Meza, [address] [facility], APRN - CRNA   8/18/2021

## 2021-08-18 NOTE — FLOWSHEET NOTE
RN remained at beside throughout pushing. EFM continuously assessed by RN and Dr. Sarah Dukes.  of viable male infant. Infant placed on moms abdomen, dried and stimulated with spontaneous cry. Cord clamped and cut-delayed cord clamping per MD.  Infant placed skin to skin with mom. Warm blanket, hat, and diaper applied.    Apgars 8/9

## 2021-08-18 NOTE — FLOWSHEET NOTE
08/18/21 0800   Fetal Heart Rate   Mode External US   Baseline Rate 130 bpm   Baseline Classification Normal   Variability 6-25 BPM   Pattern Accelerations;Variable decelerations   Patient Feels Fetal Movement Yes   Interventions RN at Bedside;Provider at Bedside   OB Bladder Status Non-distended   Fetal Monitoring Strip   FMS Reviewed?  Yes   FMS Reviewed By? am/Dr Tapia Fail   Uterine Activity   Mode IUPC;Palpation   Contraction Frequency 1.5-3   Contraction Duration 70-90   Contraction Quality Firm   Resting Tone Palpated Soft   IUP-Contraction (mmHg) 55-80   IUP-Resting (mmHg) 15-25

## 2021-08-18 NOTE — PLAN OF CARE
Problem: Anxiety:  Goal: Level of anxiety will decrease  Description: Level of anxiety will decrease  Outcome: Ongoing     Problem: Fluid Volume - Imbalance:  Goal: Absence of imbalanced fluid volume signs and symptoms  Description: Absence of imbalanced fluid volume signs and symptoms  Outcome: Ongoing     Problem: Infection - Intrapartum Infection:  Goal: Will show no infection signs and symptoms  Description: Will show no infection signs and symptoms  Outcome: Ongoing   No s/s  Problem: Pain - Acute:  Goal: Able to cope with pain  Description: Able to cope with pain  Outcome: Ongoing

## 2021-08-18 NOTE — LACTATION NOTE
This note was copied from a baby's chart. Lactation Progress Note  Initial Consult    Data: Referral received per RN. Action: LC to room. Mother resting in bed. Infant sleeping, swaddled in bassinet, showing no hunger cues at this time. Mother states agreeable to consult from [facility] at this time. I reviewed Care Plan for First 24 Hours of Life already in patient binder. Discussed recognizing hunger cues and offering the breast when cues are shown. Encouraged breastfeeding on demand and attempting/offering at least every 3 hours. Informed infant may have one 5 hour stretch of sleep in a 24 hour period. Encouraged unlimited skin to skin contact with infant and reviewed benefits including better temperature, heart rate, respiration, blood pressure, and blood sugar regulation. Also increased bonding and milk supply associated with skin to skin contact. Discussed feeding positions, latch on techniques, signs of milk transfer, output goals and normal feeding/sleeping behaviors. I referred mother to binder for additional information about breastfeeding and skin to skin contact. Discussed hand expression and encouraged mother to practice getting drops to infant today. Mother states she is able to hand express. Reinforced importance of positioning infant nose to nipple, belly to belly, waiting for wide open mouth, and bringing baby onto breast to ensure a deep latch. Discussed importance of obtaining deep latch to ensure proper milk transfer, milk production and supply and maternal comfort. Mother already has a pump for home use. Gave resources for reverse pressure softening and breastfeeding support after discharge. I wrote my name and circled the phone number on patient's whiteboard, provided a lactation consultant business card, directed mother to [facility] O[facility] for evidence based information, and encouraged mother to call with any lactation needs. Response:   Mother verbalizes understanding of information given and denies further needs at this time.

## 2021-08-18 NOTE — PROGRESS NOTES
Pt comfortable with epi  Cervix complete/+2  FHT reassuring. LOP manual rotation to OA. Begin pushing.

## 2021-08-18 NOTE — ANESTHESIA POSTPROCEDURE EVALUATION
Department of Anesthesiology  Postprocedure Note    Patient: Bret Kramer  MRN: [id number]  YOB: 1995  Date of evaluation: 8/18/2021  Time:  12:48 PM     Procedure Summary     Date: 08/18/21 Room / Location:     Anesthesia Start: 0130 Anesthesia Stop: 0702    Procedure: Labor Analgesia Diagnosis:     Scheduled Providers:  Responsible Provider: Didier Carpenter MD    Anesthesia Type: epidural ASA Status: 3          Anesthesia Type: epidural    Sloane Phase I: Sloane Score: 10    Sloane Phase II:      Last vitals: Reviewed and per EMR flowsheets.        Anesthesia Post Evaluation    Patient location during evaluation: floor  Patient participation: complete - patient participated  Level of consciousness: awake and alert  Pain score: 0  Airway patency: patent  Nausea & Vomiting: no vomiting and no nausea  Complications: no  Cardiovascular status: blood pressure returned to baseline and hemodynamically stable  Respiratory status: acceptable and room air  Hydration status: stable

## 2021-08-18 NOTE — FLOWSHEET NOTE
Wily AKHTAR at bedside giving small redose in epidural. Dr Ellen Grimes remains at bedside pushing with pt.

## 2021-08-19 VITALS
WEIGHT: 176.8 LBS | SYSTOLIC BLOOD PRESSURE: 118 MMHG | TEMPERATURE: 99 F | HEART RATE: 73 BPM | BODY MASS INDEX: 31.33 KG/M2 | OXYGEN SATURATION: 99 % | RESPIRATION RATE: 16 BRPM | HEIGHT: 63 IN | DIASTOLIC BLOOD PRESSURE: 73 MMHG

## 2021-08-19 LAB
ALBUMIN SERPL-MCNC: 2.9 G/DL (ref 3.4–5)
ALP BLD-CCNC: 116 U/L (ref 40–129)
ALT SERPL-CCNC: 16 U/L (ref 10–40)
AST SERPL-CCNC: 27 U/L (ref 15–37)
BILIRUB SERPL-MCNC: <0.2 MG/DL (ref 0–1)
BILIRUBIN DIRECT: <0.2 MG/DL (ref 0–0.3)
BILIRUBIN, INDIRECT: ABNORMAL MG/DL (ref 0–1)
BUN BLDV-MCNC: 9 MG/DL (ref 7–20)
CREAT SERPL-MCNC: 0.7 MG/DL (ref 0.6–1.1)
CREATININE URINE: 148.5 MG/DL (ref 28–259)
GFR AFRICAN AMERICAN: >60
GFR NON-AFRICAN AMERICAN: >60
HCT VFR BLD CALC: 27.1 % (ref 36–48)
HEMOGLOBIN: 9.1 G/DL (ref 12–16)
MCH RBC QN AUTO: 27 PG (ref 26–34)
MCHC RBC AUTO-ENTMCNC: 33.7 G/DL (ref 31–36)
MCV RBC AUTO: 80.2 FL (ref 80–100)
PDW BLD-RTO: 14.2 % (ref 12.4–15.4)
PLATELET # BLD: 205 K/UL (ref 135–450)
PMV BLD AUTO: 8.3 FL (ref 5–10.5)
PROTEIN PROTEIN: 45 MG/DL
PROTEIN/CREAT RATIO: 0.3 MG/DL
RBC # BLD: 3.38 M/UL (ref 4–5.2)
TOTAL PROTEIN: 5.6 G/DL (ref 6.4–8.2)
URIC ACID, SERUM: 5.1 MG/DL (ref 2.6–6)
WBC # BLD: 11.2 K/UL (ref 4–11)

## 2021-08-19 PROCEDURE — 82570 ASSAY OF URINE CREATININE: CPT

## 2021-08-19 PROCEDURE — 6370000000 HC RX 637 (ALT 250 FOR IP): Performed by: OBSTETRICS & GYNECOLOGY

## 2021-08-19 PROCEDURE — 80076 HEPATIC FUNCTION PANEL: CPT

## 2021-08-19 PROCEDURE — 84550 ASSAY OF BLOOD/URIC ACID: CPT

## 2021-08-19 PROCEDURE — 82565 ASSAY OF CREATININE: CPT

## 2021-08-19 PROCEDURE — 84520 ASSAY OF UREA NITROGEN: CPT

## 2021-08-19 PROCEDURE — 85027 COMPLETE CBC AUTOMATED: CPT

## 2021-08-19 PROCEDURE — 84156 ASSAY OF PROTEIN URINE: CPT

## 2021-08-19 RX ORDER — NIFEDIPINE 30 MG/1
30 TABLET, EXTENDED RELEASE ORAL DAILY
Qty: 90 TABLET | Refills: 1 | Status: SHIPPED | OUTPATIENT
Start: 2021-08-19

## 2021-08-19 RX ADMIN — IBUPROFEN 800 MG: 800 TABLET, FILM COATED ORAL at 08:46

## 2021-08-19 RX ADMIN — IBUPROFEN 800 MG: 800 TABLET, FILM COATED ORAL at 00:11

## 2021-08-19 RX ADMIN — DOCUSATE SODIUM 100 MG: 100 CAPSULE ORAL at 08:46

## 2021-08-19 ASSESSMENT — PAIN SCALES - GENERAL
PAINLEVEL_OUTOF10: 0
PAINLEVEL_OUTOF10: 0
PAINLEVEL_OUTOF10: 3
PAINLEVEL_OUTOF10: 0
PAINLEVEL_OUTOF10: 4
PAINLEVEL_OUTOF10: 2
PAINLEVEL_OUTOF10: 0
PAINLEVEL_OUTOF10: 2

## 2021-08-19 ASSESSMENT — PAIN DESCRIPTION - PROGRESSION
CLINICAL_PROGRESSION: GRADUALLY IMPROVING

## 2021-08-19 ASSESSMENT — PAIN DESCRIPTION - PAIN TYPE
TYPE: ACUTE PAIN

## 2021-08-19 ASSESSMENT — PAIN DESCRIPTION - LOCATION
LOCATION: PERINEUM

## 2021-08-19 ASSESSMENT — PAIN DESCRIPTION - FREQUENCY
FREQUENCY: INTERMITTENT

## 2021-08-19 ASSESSMENT — PAIN DESCRIPTION - DESCRIPTORS
DESCRIPTORS: DISCOMFORT;ACHING
DESCRIPTORS: ACHING;DISCOMFORT
DESCRIPTORS: DISCOMFORT;ACHING

## 2021-08-19 ASSESSMENT — PAIN DESCRIPTION - ONSET
ONSET: ON-GOING
ONSET: ON-GOING

## 2021-08-19 ASSESSMENT — PAIN - FUNCTIONAL ASSESSMENT
PAIN_FUNCTIONAL_ASSESSMENT: ACTIVITIES ARE NOT PREVENTED

## 2021-08-19 NOTE — PLAN OF CARE
Problem: Discharge Planning:  Goal: Discharged to appropriate level of care  Description: Discharged to appropriate level of care  Outcome: Met This Shift     Problem: Constipation:  Goal: Bowel elimination is within specified parameters  Description: Bowel elimination is within specified parameters  Outcome: Met This Shift     Problem: Fluid Volume - Imbalance:  Goal: Absence of imbalanced fluid volume signs and symptoms  Description: Absence of imbalanced fluid volume signs and symptoms  Outcome: Met This Shift  Goal: Absence of postpartum hemorrhage signs and symptoms  Description: Absence of postpartum hemorrhage signs and symptoms  Outcome: Met This Shift     Problem: Infection - Risk of, Puerperal Infection:  Goal: Will show no infection signs and symptoms  Description: Will show no infection signs and symptoms  Outcome: Met This Shift     Problem: Mood - Altered:  Goal: Mood stable  Description: Mood stable  Outcome: Met This Shift     Problem: Pain - Acute:  Goal: Pain level will decrease  Description: Pain level will decrease  Outcome: Met This Shift

## 2021-08-19 NOTE — FLOWSHEET NOTE
Postpartum and infant care teaching completed and forms signed by patient. Copy witnessed by RN and given to patient. Patient verbalized understanding of all teaching points. Prescriptions given. Patient plans to follow-up with [facility] Provider as instructed. Patient verbalizes understanding of discharge instructions and denies further questions. ID bands checked. Mother's ID band and one of baby's ID bands removed and taped to footprint sheet, signed by patient and witnessed by RN. Patient discharged in stable condition accompanied by family. Discharged in wheelchair, holding baby in car seat.

## 2021-08-19 NOTE — FLOWSHEET NOTE
Assessments and vital signs completed, documented in flowsheets. All findings WNL. White board updated. Plan of care for the day discussed. Patient verbalized understanding and had no further questions.

## 2021-08-19 NOTE — LACTATION NOTE
This note was copied from a baby's chart. LC to room. Mother states breastfeeding is going well, states no pain/discomfort with latching/positioning at this time. LC reinforced importance of positioning infant nose to nipple, belly to belly, waiting for wide open mouth, and bringing baby onto breast to ensure a deep latch. LC reviewed handouts including Reverse Pressure Softening for engorgement. LC discussed cluster feeding in depth, encouraged mother to rest today between feeding attempts. Mother agreed and denies any further needs at this time. LC encouraged mother to call for feedings as needed.

## 2021-08-19 NOTE — PROGRESS NOTES
Department of Obstetrics and Gynecology  Vaginal Delivery Postpartum Rounds    SUBJECTIVE:  Pain is controlled with Tylenol or non-steroidal anti-inflammatory drugs. Her lochia is normal.    OBJECTIVE:  Vital Signs: BP (!) 146/89 Comment: notify nurse  Pulse 75   Temp 97.9 °F (36.6 °C) (Oral)   Resp 16   Ht 5' 3\" (1.6 m)   Wt 176 lb 12.8 oz (80.2 kg)   SpO2 99%   Breastfeeding Unknown   BMI 31.32 kg/m²   Appearance/Psychiatric: awake, alert, cooperative, no apparent distress, appears stated age  Constitutional: The patient is well nourished. Cardiovascular: She does not have edema. Respiratory: Respiratory effort is normal.  Gastrointestinal: Soft, non tender, uterine fundus is firm below umbilicus  Extremities: nontender to palpation  Perineum: intact     LABS / IMAGING:  CBC:   Lab Results   Component Value Date    WBC 9.5 2021    RBC 3.96 2021    HGB 10.5 2021    HCT 31.7 2021    MCV 80.0 2021    MCH 26.5 2021    MCHC 33.2 2021    RDW 14.0 2021     2021    MPV 8.7 2021       Lab Results   Component Value Date    WBC 9.5 2021    RBC 3.96 2021    HGB 10.5 2021    HCT 31.7 2021    MCV 80.0 2021    MCH 26.5 2021    MCHC 33.2 2021    RDW 14.0 2021     2021    MPV 8.7 2021     Lab Results   Component Value Date     2021    K 4.2 2021     2021    CO2 20 2021    BUN 8 2021    CREATININE 0.5 2021    GLUCOSE 73 2021    CALCIUM 8.6 2021     No results found for: POCGLU  Lab Results   Component Value Date    ALKPHOS 140 2021    ALT 16 2021    AST 20 2021    PROT 6.4 2021    BILITOT <0.2 2021    LABALBU 3.4 2021     Lab Results   Component Value Date    PHUR 7.0 2021     No results found for: LABRPR    ASSESSMENT:    Postpartum Day 1 s/p , IOL preE    PLAN:   1.  Continue routine postpartum care   2. Discharge home on Postpartum Day 1  3. Return to office in 1 weeks   4. Postpartum instructions reviewed and all patient's Questions answered    START PROCARDIA, MILD RANGE BPS .  REPEAT LABS TODAY  HAS CIRC APPT NEXT WEEK    Electronically signed by Keegan Cote DO on 8/19/2021 at 2:00 PM

## 2021-08-19 NOTE — LACTATION NOTE
This note was copied from a baby's chart. LC to room. Per Ped, LC gave handouts on tongue tie, information about having the procedure done, exercises to perform after and following up with lactation services as needed. Mother agreed and denies any further needs at this time.

## 2021-08-19 NOTE — FLOWSHEET NOTE
Patient states that ice \"stings\" so she isn't using it at all. Encouraged her to use off and on, applying it for 15 minutes after iraj-care, then removing it for comfort.

## 2021-08-19 NOTE — PLAN OF CARE
Problem: Discharge Planning:  Goal: Discharged to appropriate level of care  Description: Discharged to appropriate level of care  8/19/2021 1601 by Michael Herrera  Outcome: Completed  8/19/2021 0646 by Mari Sanchez RN  Outcome: Met This Shift     Problem: Constipation:  Goal: Bowel elimination is within specified parameters  Description: Bowel elimination is within specified parameters  8/19/2021 1601 by Michael Herrera  Outcome: Completed  8/19/2021 0646 by Mari Sanchez RN  Outcome: Met This Shift     Problem: Fluid Volume - Imbalance:  Goal: Absence of imbalanced fluid volume signs and symptoms  Description: Absence of imbalanced fluid volume signs and symptoms  8/19/2021 1601 by Michael Herrera  Outcome: Completed  8/19/2021 0646 by Mari Sanchez RN  Outcome: Met This Shift  Goal: Absence of postpartum hemorrhage signs and symptoms  Description: Absence of postpartum hemorrhage signs and symptoms  8/19/2021 1601 by Michael Herrera  Outcome: Completed  8/19/2021 0646 by Mari Sanchez RN  Outcome: Met This Shift     Problem: Infection - Risk of, Puerperal Infection:  Goal: Will show no infection signs and symptoms  Description: Will show no infection signs and symptoms  8/19/2021 1601 by Michael Herrera  Outcome: Completed  8/19/2021 0646 by Mari Sanchez RN  Outcome: Met This Shift     Problem: Mood - Altered:  Goal: Mood stable  Description: Mood stable  8/19/2021 1601 by Michael Herrera  Outcome: Completed  8/19/2021 0646 by Mari Sanchez RN  Outcome: Met This Shift     Problem: Pain - Acute:  Goal: Pain level will decrease  Description: Pain level will decrease  8/19/2021 1601 by Michael Herrera  Outcome: Completed  8/19/2021 0646 by Mari Sanchez RN  Outcome: Met This Shift

## 2021-08-20 NOTE — PROGRESS NOTES
CLINICAL PHARMACY NOTE: MEDS TO BEDS    Total # of Prescriptions Filled: 1   The following medications were delivered to the patient:  Discharge Medication List as of 8/19/2021  4:09 PM      START taking these medications    Details   NIFEdipine (PROCARDIA XL) 30 MG extended release tablet Take 1 tablet by mouth daily, Disp-90 tablet, R-1Print         ·   ·     Additional Documentation:

## 2022-08-11 LAB
C. TRACHOMATIS, EXTERNAL RESULT: NEGATIVE
HEP B, EXTERNAL RESULT: NEGATIVE
HEPATITIS C ANTIBODY, EXTERNAL RESULT: NEGATIVE
HIV, EXTERNAL RESULT: NORMAL
N. GONORRHOEAE, EXTERNAL RESULT: NEGATIVE
RPR, EXTERNAL RESULT: NORMAL
RUBELLA TITER, EXTERNAL RESULT: NORMAL

## 2023-02-20 ENCOUNTER — HOSPITAL ENCOUNTER (OUTPATIENT)
Age: 28
Discharge: HOME | End: 2023-02-20
Attending: OBSTETRICS & GYNECOLOGY | Admitting: ADVANCED PRACTICE MIDWIFE
Payer: COMMERCIAL

## 2023-02-20 VITALS
RESPIRATION RATE: 18 BRPM | BODY MASS INDEX: 32.96 KG/M2 | DIASTOLIC BLOOD PRESSURE: 82 MMHG | HEIGHT: 63 IN | TEMPERATURE: 97.2 F | HEART RATE: 92 BPM | WEIGHT: 186 LBS | SYSTOLIC BLOOD PRESSURE: 124 MMHG

## 2023-02-20 PROCEDURE — 99212 OFFICE O/P EST SF 10 MIN: CPT

## 2023-02-20 PROCEDURE — 59025 FETAL NON-STRESS TEST: CPT

## 2023-02-20 RX ORDER — ASPIRIN 81 MG/1
81 TABLET, CHEWABLE ORAL DAILY
COMMUNITY

## 2023-02-20 RX ORDER — FERROUS SULFATE 325(65) MG
325 TABLET ORAL
COMMUNITY

## 2023-02-20 NOTE — DISCHARGE INSTRUCTIONS
Home Undelivered Discharge Instructions    After completion of the medical screening evaluation, it has been determined that a medical emergency does not exist and the patient is not in active labor. Therefore, the patient may be discharged home. After Discharge Orders:            Diet:  normal diet as tolerated    Rest: normal activity as tolerated\        Diet/Activity/Restrictions:  Regular  Limit caffeine consumption  Increase your fluid intake  Eat small meals-but often. Rise from laying/sitting position to standing slowly. Avoid laying on your back, sidelying positions are best, left side is preferred. Weigh yourself daily and write down what you weigh. If you had a vaginal exam you may have some bloody mucous or brown vaginal discharge. If your doctor/midwife has ordered antibiotics, get it filled right away and take all of the medication as it has been prescribed. When to call your doctor: If you have severe back pain. Period-like cramps that may come and go. May feel like baby is balling up. Low, dull backache, not relieved by rest.  Pressure in your vagina or lower abdomen that may feel like the baby is pushing down. Change in the type or amount of vaginal discharge. Abdominal cramps that may be accompanied by diarrhea. 4-5 uterine contractions in one hour. Fluid leaking from your vagina (may or may not be bloody)  If you have vaginal bleeding. If you have a temperature of 100.6 or more. If your baby has a decrease in activity. Less than 5 times in an hour. If you feel you are not getting better or you are concerned. If you develop a headache, not resolved with your usual interventions. If you have changes in your vision (blurring or spots in your vision). If you have burning with urination, urgency or frequency. If you have pain in your abdomen, up by your ribs.                 General Instructions:    Nausea and Vomiting  Keep dry toast/crackers with you to munch on  Eat small frequent meals  Try to keep something in your stomach (don't let your stomach get empty)  Take your time getting up  Avoid smells that make you feel sick    Travel  Wear seatbelts or safety/lap belts  Walk frequently, every 1-2 hours  Wear clothing that does not constrict and comfortable shoes  Keep a light snack (e.g. Dry crackers) with you at all times to prevent nausea  Drink plenty of water, low sodium and noncaffeinated drinks. DO NOT take any medication that is not approved by your physician first    Swelling (Edema)  Avoid standing for long periods, keep legs up when you can  When resting, lie on your side (left side is best)  Limit the amount of salty foods you eat  Try to wear support hose as much as possible    Exercise  Avoid situations that would cause you to become overheated  Exercise outdoors only if the weather is reasonable and not too hot  Do not over exert yourself when you exercise  Drink plenty of fluids, especially water  Wear good support hose, bra and shoes when exercising    Varicose Veins  Try to keep your legs elevated when you are sitting  When lying down, keep your legs elevated  Do not stand for long periods of time  When wearing stockings or socks, make sure they are not too tight and bind your legs  Wear support hose/stockings at all times  If you have a job where you sit a lot, get up periodically and walk around      Other instructions: Do kick counts once a day on your baby. Choose the time of day your baby is most active. Get in a comfortable lying or sitting position and time how long it takes to feel 10 kicks, twists, turns, swishes, or rolls. Call your physician or midwife if there have not been 10 kicks in 2 hours                Fetal Movement Chart    A daily diary of your baby's movements provides useful information. Please lesa down anytime the baby moves during at least one convenient hour each day. Pick an hour when the baby is usually active.   It is also important that you have a regular meal within two hours. Dominique Sherman on your left side, in this position the circulation to the baby is improved, and count the number of movements. If the baby moves less than 5-6 times in an hour, or you are concerned about you or your baby call your doctor or midwife.         Date Time Counts Total Date Time Counts Total

## 2023-02-20 NOTE — FLOWSHEET NOTE
Pt here for complaints of headache rates 2/10. States went to office since Tylenol did not help. States BP was elevated in office and was told to come to hospital to get evaluated. Pt placed on monitor hooked into [employer]

## 2023-02-20 NOTE — PROGRESS NOTES
Department of Obstetrics and Gynecology  Triage Evaluation Note    SUBJECTIVE:  Lillie Acevedo is a 24 y.o.,  at 35w3d who presents for headache. She denies vaginal bleeding, leakage of fluid, or contractions. She states the baby is moving well. She denies fever, shortness of breath, palpitations, nausea, vomiting, diarrhea on ROS. Pt was seen in the office, had a mild range BP. Was seen 4 days ago for elevated BP in office with normal BPs at home, had nml PIH labs other than elevated urine protein: creatinine ration of 0.47mg. Pt reports a mild headache (1-2/10) that is not bothersome. Denies swelling, RUQ/epigastric pain. No vision changes. I personally reviewed the past medical and surgical histories, as well as the problem list.    OBJECTIVE:  Vital Signs: /82   Pulse 92   Temp 97.2 °F (36.2 °C) (Temporal)   Resp 18   Ht 5' 3\" (1.6 m)   Wt 186 lb (84.4 kg)   BMI 32.95 kg/m²   Appearance/Psychiatric: alert and oriented X3  Constitutional: appears well, no distress  Cardiovascular: She does not have edema. Respiratory:Respiratory effort is normal.  Gastrointestinal: soft, non tender, Gravid. The uterine size is equal to dates. Pelvic: Cervix n/a. NST read: reactive and reassuring, moderate variability with accelerations, nonrepetitive variable decelerations  [place]: irregular    Bedside sono: cephalic, MAXI 00.1FY, BPP 8/8    LABS:      ASSESSMENT AND PLAN:  24 y.o.  @ 35 3/7 wks     1. Headache - normal BPs, mild headache    2. Fetal status - reactive and reassuring, BPP 8/8 with nml MAXI. 3. Dispo - d/c home, has f/u on Thursday    The patient will follow up in 4 days. She was counseled to call or return for vaginal bleeding, regular contractions, leakage of fluid or decreased fetal movement.     Electronically signed by Monica Crump MD on 2023 at 3:37 PM

## 2023-02-20 NOTE — FLOWSHEET NOTE
Discharged to home undelivered in stable condition. Instructions reviewed.   Pt verbalizes understanding to follow up on Thursday at office

## 2023-02-22 DIAGNOSIS — D50.8 OTHER IRON DEFICIENCY ANEMIAS: ICD-10-CM

## 2023-02-22 DIAGNOSIS — K90.49 MALABSORPTION DUE TO INTOLERANCE, NOT ELSEWHERE CLASSIFIED: ICD-10-CM

## 2023-02-22 RX ORDER — EPINEPHRINE 1 MG/ML
0.3 INJECTION, SOLUTION, CONCENTRATE INTRAVENOUS PRN
OUTPATIENT
Start: 2023-03-01

## 2023-02-22 RX ORDER — SODIUM CHLORIDE 0.9 % (FLUSH) 0.9 %
5-40 SYRINGE (ML) INJECTION PRN
OUTPATIENT
Start: 2023-03-01

## 2023-02-22 RX ORDER — ALBUTEROL SULFATE 90 UG/1
4 AEROSOL, METERED RESPIRATORY (INHALATION) PRN
OUTPATIENT
Start: 2023-03-01

## 2023-02-22 RX ORDER — ONDANSETRON 2 MG/ML
8 INJECTION INTRAMUSCULAR; INTRAVENOUS
OUTPATIENT
Start: 2023-03-01

## 2023-02-22 RX ORDER — SODIUM CHLORIDE 9 MG/ML
INJECTION, SOLUTION INTRAVENOUS CONTINUOUS
OUTPATIENT
Start: 2023-03-01

## 2023-02-22 RX ORDER — DIPHENHYDRAMINE HYDROCHLORIDE 50 MG/ML
50 INJECTION INTRAMUSCULAR; INTRAVENOUS
OUTPATIENT
Start: 2023-03-01

## 2023-02-22 RX ORDER — ACETAMINOPHEN 325 MG/1
650 TABLET ORAL
OUTPATIENT
Start: 2023-03-01

## 2023-02-25 LAB — GBS, EXTERNAL RESULT: NEGATIVE

## 2023-03-02 ENCOUNTER — PREP FOR PROCEDURE (OUTPATIENT)
Dept: OBGYN | Age: 28
End: 2023-03-02

## 2023-03-02 RX ORDER — METHYLERGONOVINE MALEATE 0.2 MG/ML
200 INJECTION INTRAVENOUS PRN
Status: CANCELLED | OUTPATIENT
Start: 2023-03-02

## 2023-03-02 RX ORDER — SODIUM CHLORIDE, SODIUM LACTATE, POTASSIUM CHLORIDE, CALCIUM CHLORIDE 600; 310; 30; 20 MG/100ML; MG/100ML; MG/100ML; MG/100ML
INJECTION, SOLUTION INTRAVENOUS CONTINUOUS
Status: CANCELLED | OUTPATIENT
Start: 2023-03-02

## 2023-03-02 RX ORDER — SODIUM CHLORIDE 9 MG/ML
25 INJECTION, SOLUTION INTRAVENOUS PRN
Status: CANCELLED | OUTPATIENT
Start: 2023-03-02

## 2023-03-02 RX ORDER — ONDANSETRON 2 MG/ML
4 INJECTION INTRAMUSCULAR; INTRAVENOUS EVERY 6 HOURS PRN
Status: CANCELLED | OUTPATIENT
Start: 2023-03-02

## 2023-03-02 RX ORDER — SODIUM CHLORIDE, SODIUM LACTATE, POTASSIUM CHLORIDE, AND CALCIUM CHLORIDE .6; .31; .03; .02 G/100ML; G/100ML; G/100ML; G/100ML
500 INJECTION, SOLUTION INTRAVENOUS PRN
Status: CANCELLED | OUTPATIENT
Start: 2023-03-02

## 2023-03-02 RX ORDER — SODIUM CHLORIDE 0.9 % (FLUSH) 0.9 %
5-40 SYRINGE (ML) INJECTION PRN
Status: CANCELLED | OUTPATIENT
Start: 2023-03-02

## 2023-03-02 RX ORDER — SODIUM CHLORIDE, SODIUM LACTATE, POTASSIUM CHLORIDE, AND CALCIUM CHLORIDE .6; .31; .03; .02 G/100ML; G/100ML; G/100ML; G/100ML
1000 INJECTION, SOLUTION INTRAVENOUS PRN
Status: CANCELLED | OUTPATIENT
Start: 2023-03-02

## 2023-03-02 RX ORDER — SODIUM CHLORIDE 0.9 % (FLUSH) 0.9 %
5-40 SYRINGE (ML) INJECTION EVERY 12 HOURS SCHEDULED
Status: CANCELLED | OUTPATIENT
Start: 2023-03-02

## 2023-03-02 RX ORDER — CARBOPROST TROMETHAMINE 250 UG/ML
250 INJECTION, SOLUTION INTRAMUSCULAR PRN
Status: CANCELLED | OUTPATIENT
Start: 2023-03-02

## 2023-03-02 RX ORDER — MISOPROSTOL 100 UG/1
800 TABLET ORAL PRN
Status: CANCELLED | OUTPATIENT
Start: 2023-03-02

## 2023-03-09 ENCOUNTER — ANESTHESIA (OUTPATIENT)
Dept: LABOR AND DELIVERY | Age: 28
End: 2023-03-09
Payer: COMMERCIAL

## 2023-03-09 ENCOUNTER — ANESTHESIA EVENT (OUTPATIENT)
Dept: LABOR AND DELIVERY | Age: 28
End: 2023-03-09
Payer: COMMERCIAL

## 2023-03-09 ENCOUNTER — HOSPITAL ENCOUNTER (INPATIENT)
Age: 28
LOS: 3 days | Discharge: HOME | End: 2023-03-12
Attending: OBSTETRICS & GYNECOLOGY | Admitting: OBSTETRICS & GYNECOLOGY
Payer: COMMERCIAL

## 2023-03-09 ENCOUNTER — APPOINTMENT (OUTPATIENT)
Dept: LABOR AND DELIVERY | Age: 28
End: 2023-03-09
Payer: COMMERCIAL

## 2023-03-09 PROBLEM — Z98.890 STATUS POST INDUCTION OF LABOR: Status: ACTIVE | Noted: 2023-03-09

## 2023-03-09 LAB
A/G RATIO: 1.3 (ref 1.1–2.2)
ABO/RH: NORMAL
ALBUMIN SERPL-MCNC: 3.3 G/DL (ref 3.4–5)
ALP BLD-CCNC: 131 U/L (ref 40–129)
ALT SERPL-CCNC: 8 U/L (ref 10–40)
AMPHETAMINE SCREEN, URINE: NORMAL
ANION GAP SERPL CALCULATED.3IONS-SCNC: 11 MMOL/L (ref 3–16)
ANTIBODY SCREEN: NORMAL
APTT: 25.6 SEC (ref 23–34.3)
AST SERPL-CCNC: 13 U/L (ref 15–37)
BARBITURATE SCREEN URINE: NORMAL
BENZODIAZEPINE SCREEN, URINE: NORMAL
BILIRUB SERPL-MCNC: <0.2 MG/DL (ref 0–1)
BUN BLDV-MCNC: 12 MG/DL (ref 7–20)
BUPRENORPHINE URINE: NORMAL
CALCIUM SERPL-MCNC: 8.4 MG/DL (ref 8.3–10.6)
CANNABINOID SCREEN URINE: NORMAL
CHLORIDE BLD-SCNC: 105 MMOL/L (ref 99–110)
CO2: 21 MMOL/L (ref 21–32)
COCAINE METABOLITE SCREEN URINE: NORMAL
CREAT SERPL-MCNC: <0.5 MG/DL (ref 0.6–1.1)
CREATININE URINE: 304.3 MG/DL (ref 28–259)
FENTANYL SCREEN, URINE: NORMAL
FIBRINOGEN: 388 MG/DL (ref 207–509)
GFR SERPL CREATININE-BSD FRML MDRD: >60 ML/MIN/{1.73_M2}
GLUCOSE BLD-MCNC: 79 MG/DL (ref 70–99)
HCT VFR BLD CALC: 32 % (ref 36–48)
HEMOGLOBIN: 10.5 G/DL (ref 12–16)
INR BLD: 0.93 (ref 0.87–1.14)
Lab: NORMAL
MCH RBC QN AUTO: 25.2 PG (ref 26–34)
MCHC RBC AUTO-ENTMCNC: 32.7 G/DL (ref 31–36)
MCV RBC AUTO: 77 FL (ref 80–100)
METHADONE SCREEN, URINE: NORMAL
OPIATE SCREEN URINE: NORMAL
OXYCODONE URINE: NORMAL
PDW BLD-RTO: 21.9 % (ref 12.4–15.4)
PH UA: 6
PHENCYCLIDINE SCREEN URINE: NORMAL
PLATELET # BLD: 270 K/UL (ref 135–450)
PMV BLD AUTO: 8.6 FL (ref 5–10.5)
POTASSIUM SERPL-SCNC: 4.4 MMOL/L (ref 3.5–5.1)
PROTEIN PROTEIN: 413 MG/DL
PROTEIN/CREAT RATIO: 1.4 MG/DL
PROTHROMBIN TIME: 12.3 SEC (ref 11.7–14.5)
RBC # BLD: 4.16 M/UL (ref 4–5.2)
SODIUM BLD-SCNC: 137 MMOL/L (ref 136–145)
TOTAL PROTEIN: 5.8 G/DL (ref 6.4–8.2)
URIC ACID, SERUM: 3.8 MG/DL (ref 2.6–6)
WBC # BLD: 9.6 K/UL (ref 4–11)

## 2023-03-09 PROCEDURE — 82570 ASSAY OF URINE CREATININE: CPT

## 2023-03-09 PROCEDURE — 85384 FIBRINOGEN ACTIVITY: CPT

## 2023-03-09 PROCEDURE — 85027 COMPLETE CBC AUTOMATED: CPT

## 2023-03-09 PROCEDURE — 86900 BLOOD TYPING SEROLOGIC ABO: CPT

## 2023-03-09 PROCEDURE — 80307 DRUG TEST PRSMV CHEM ANLYZR: CPT

## 2023-03-09 PROCEDURE — 85610 PROTHROMBIN TIME: CPT

## 2023-03-09 PROCEDURE — 86780 TREPONEMA PALLIDUM: CPT

## 2023-03-09 PROCEDURE — 2580000003 HC RX 258: Performed by: OBSTETRICS & GYNECOLOGY

## 2023-03-09 PROCEDURE — 85730 THROMBOPLASTIN TIME PARTIAL: CPT

## 2023-03-09 PROCEDURE — 84550 ASSAY OF BLOOD/URIC ACID: CPT

## 2023-03-09 PROCEDURE — 84156 ASSAY OF PROTEIN URINE: CPT

## 2023-03-09 PROCEDURE — 1220000000 HC SEMI PRIVATE OB R&B

## 2023-03-09 PROCEDURE — 80053 COMPREHEN METABOLIC PANEL: CPT

## 2023-03-09 PROCEDURE — 86901 BLOOD TYPING SEROLOGIC RH(D): CPT

## 2023-03-09 PROCEDURE — 86850 RBC ANTIBODY SCREEN: CPT

## 2023-03-09 RX ORDER — SODIUM CHLORIDE 0.9 % (FLUSH) 0.9 %
5-40 SYRINGE (ML) INJECTION EVERY 12 HOURS SCHEDULED
Status: DISCONTINUED | OUTPATIENT
Start: 2023-03-09 | End: 2023-03-12 | Stop reason: HOSPADM

## 2023-03-09 RX ORDER — SODIUM CHLORIDE 0.9 % (FLUSH) 0.9 %
5-40 SYRINGE (ML) INJECTION PRN
Status: DISCONTINUED | OUTPATIENT
Start: 2023-03-09 | End: 2023-03-12 | Stop reason: HOSPADM

## 2023-03-09 RX ORDER — METHYLERGONOVINE MALEATE 0.2 MG/ML
200 INJECTION INTRAVENOUS PRN
Status: DISCONTINUED | OUTPATIENT
Start: 2023-03-09 | End: 2023-03-12 | Stop reason: HOSPADM

## 2023-03-09 RX ORDER — LORATADINE 10 MG/1
10 CAPSULE, LIQUID FILLED ORAL DAILY
COMMUNITY

## 2023-03-09 RX ORDER — SODIUM CHLORIDE, SODIUM LACTATE, POTASSIUM CHLORIDE, AND CALCIUM CHLORIDE .6; .31; .03; .02 G/100ML; G/100ML; G/100ML; G/100ML
1000 INJECTION, SOLUTION INTRAVENOUS PRN
Status: DISCONTINUED | OUTPATIENT
Start: 2023-03-09 | End: 2023-03-12 | Stop reason: HOSPADM

## 2023-03-09 RX ORDER — NIFEDIPINE 30 MG/1
30 TABLET, EXTENDED RELEASE ORAL DAILY
COMMUNITY

## 2023-03-09 RX ORDER — SODIUM CHLORIDE, SODIUM LACTATE, POTASSIUM CHLORIDE, CALCIUM CHLORIDE 600; 310; 30; 20 MG/100ML; MG/100ML; MG/100ML; MG/100ML
INJECTION, SOLUTION INTRAVENOUS CONTINUOUS
Status: DISCONTINUED | OUTPATIENT
Start: 2023-03-09 | End: 2023-03-10 | Stop reason: SDUPTHER

## 2023-03-09 RX ORDER — SODIUM CHLORIDE 9 MG/ML
25 INJECTION, SOLUTION INTRAVENOUS PRN
Status: DISCONTINUED | OUTPATIENT
Start: 2023-03-09 | End: 2023-03-10 | Stop reason: SDUPTHER

## 2023-03-09 RX ORDER — CARBOPROST TROMETHAMINE 250 UG/ML
250 INJECTION, SOLUTION INTRAMUSCULAR PRN
Status: DISCONTINUED | OUTPATIENT
Start: 2023-03-09 | End: 2023-03-12 | Stop reason: HOSPADM

## 2023-03-09 RX ORDER — ONDANSETRON 2 MG/ML
4 INJECTION INTRAMUSCULAR; INTRAVENOUS EVERY 6 HOURS PRN
Status: DISCONTINUED | OUTPATIENT
Start: 2023-03-09 | End: 2023-03-10

## 2023-03-09 RX ORDER — MISOPROSTOL 200 UG/1
800 TABLET ORAL PRN
Status: DISCONTINUED | OUTPATIENT
Start: 2023-03-09 | End: 2023-03-12 | Stop reason: HOSPADM

## 2023-03-09 RX ORDER — SODIUM CHLORIDE, SODIUM LACTATE, POTASSIUM CHLORIDE, AND CALCIUM CHLORIDE .6; .31; .03; .02 G/100ML; G/100ML; G/100ML; G/100ML
500 INJECTION, SOLUTION INTRAVENOUS PRN
Status: DISCONTINUED | OUTPATIENT
Start: 2023-03-09 | End: 2023-03-12 | Stop reason: HOSPADM

## 2023-03-09 RX ADMIN — SODIUM CHLORIDE, POTASSIUM CHLORIDE, SODIUM LACTATE AND CALCIUM CHLORIDE: 600; 310; 30; 20 INJECTION, SOLUTION INTRAVENOUS at 20:21

## 2023-03-10 LAB — TOTAL SYPHILLIS IGG/IGM: NORMAL

## 2023-03-10 PROCEDURE — 6360000002 HC RX W HCPCS: Performed by: ANESTHESIOLOGY

## 2023-03-10 PROCEDURE — 6360000002 HC RX W HCPCS: Performed by: OBSTETRICS & GYNECOLOGY

## 2023-03-10 PROCEDURE — 7200000001 HC VAGINAL DELIVERY

## 2023-03-10 PROCEDURE — 0KQM0ZZ REPAIR PERINEUM MUSCLE, OPEN APPROACH: ICD-10-PCS | Performed by: OBSTETRICS & GYNECOLOGY

## 2023-03-10 PROCEDURE — 51701 INSERT BLADDER CATHETER: CPT

## 2023-03-10 PROCEDURE — 6370000000 HC RX 637 (ALT 250 FOR IP): Performed by: OBSTETRICS & GYNECOLOGY

## 2023-03-10 PROCEDURE — 2580000003 HC RX 258: Performed by: OBSTETRICS & GYNECOLOGY

## 2023-03-10 PROCEDURE — 59025 FETAL NON-STRESS TEST: CPT

## 2023-03-10 PROCEDURE — 96374 THER/PROPH/DIAG INJ IV PUSH: CPT

## 2023-03-10 PROCEDURE — 3700000025 EPIDURAL BLOCK: Performed by: ANESTHESIOLOGY

## 2023-03-10 PROCEDURE — 2500000003 HC RX 250 WO HCPCS: Performed by: NURSE ANESTHETIST, CERTIFIED REGISTERED

## 2023-03-10 PROCEDURE — 3E0P7VZ INTRODUCTION OF HORMONE INTO FEMALE REPRODUCTIVE, VIA NATURAL OR ARTIFICIAL OPENING: ICD-10-PCS | Performed by: OBSTETRICS & GYNECOLOGY

## 2023-03-10 PROCEDURE — 1220000000 HC SEMI PRIVATE OB R&B

## 2023-03-10 PROCEDURE — 2500000003 HC RX 250 WO HCPCS

## 2023-03-10 RX ORDER — LANOLIN 100 %
OINTMENT (GRAM) TOPICAL PRN
Status: DISCONTINUED | OUTPATIENT
Start: 2023-03-10 | End: 2023-03-12 | Stop reason: HOSPADM

## 2023-03-10 RX ORDER — NALOXONE HYDROCHLORIDE 0.4 MG/ML
INJECTION, SOLUTION INTRAMUSCULAR; INTRAVENOUS; SUBCUTANEOUS PRN
Status: DISCONTINUED | OUTPATIENT
Start: 2023-03-10 | End: 2023-03-10 | Stop reason: HOSPADM

## 2023-03-10 RX ORDER — SODIUM CHLORIDE 0.9 % (FLUSH) 0.9 %
5-40 SYRINGE (ML) INJECTION PRN
Status: DISCONTINUED | OUTPATIENT
Start: 2023-03-10 | End: 2023-03-12 | Stop reason: HOSPADM

## 2023-03-10 RX ORDER — BUPIVACAINE HYDROCHLORIDE 2.5 MG/ML
INJECTION, SOLUTION EPIDURAL; INFILTRATION; INTRACAUDAL PRN
Status: DISCONTINUED | OUTPATIENT
Start: 2023-03-10 | End: 2023-03-10 | Stop reason: SDUPTHER

## 2023-03-10 RX ORDER — SODIUM CHLORIDE 9 MG/ML
INJECTION, SOLUTION INTRAVENOUS PRN
Status: DISCONTINUED | OUTPATIENT
Start: 2023-03-10 | End: 2023-03-12 | Stop reason: HOSPADM

## 2023-03-10 RX ORDER — LIDOCAINE HYDROCHLORIDE AND EPINEPHRINE 15; 5 MG/ML; UG/ML
INJECTION, SOLUTION EPIDURAL PRN
Status: DISCONTINUED | OUTPATIENT
Start: 2023-03-10 | End: 2023-03-10 | Stop reason: SDUPTHER

## 2023-03-10 RX ORDER — ONDANSETRON 2 MG/ML
4 INJECTION INTRAMUSCULAR; INTRAVENOUS EVERY 6 HOURS PRN
Status: DISCONTINUED | OUTPATIENT
Start: 2023-03-10 | End: 2023-03-10 | Stop reason: HOSPADM

## 2023-03-10 RX ORDER — ACETAMINOPHEN 500 MG
1000 TABLET ORAL EVERY 8 HOURS PRN
Status: DISCONTINUED | OUTPATIENT
Start: 2023-03-10 | End: 2023-03-12 | Stop reason: HOSPADM

## 2023-03-10 RX ORDER — LIDOCAINE HYDROCHLORIDE 20 MG/ML
INJECTION, SOLUTION EPIDURAL; INFILTRATION; INTRACAUDAL; PERINEURAL PRN
Status: DISCONTINUED | OUTPATIENT
Start: 2023-03-10 | End: 2023-03-10 | Stop reason: SDUPTHER

## 2023-03-10 RX ORDER — IBUPROFEN 600 MG/1
600 TABLET ORAL EVERY 8 HOURS PRN
Status: DISCONTINUED | OUTPATIENT
Start: 2023-03-10 | End: 2023-03-12 | Stop reason: HOSPADM

## 2023-03-10 RX ORDER — NALBUPHINE HCL 10 MG/ML
5 AMPUL (ML) INJECTION EVERY 4 HOURS PRN
Status: DISCONTINUED | OUTPATIENT
Start: 2023-03-10 | End: 2023-03-10 | Stop reason: HOSPADM

## 2023-03-10 RX ORDER — SODIUM CHLORIDE, SODIUM LACTATE, POTASSIUM CHLORIDE, CALCIUM CHLORIDE 600; 310; 30; 20 MG/100ML; MG/100ML; MG/100ML; MG/100ML
INJECTION, SOLUTION INTRAVENOUS CONTINUOUS
Status: DISCONTINUED | OUTPATIENT
Start: 2023-03-10 | End: 2023-03-12 | Stop reason: HOSPADM

## 2023-03-10 RX ORDER — OXYCODONE HYDROCHLORIDE 5 MG/1
5 TABLET ORAL EVERY 4 HOURS PRN
Status: DISCONTINUED | OUTPATIENT
Start: 2023-03-10 | End: 2023-03-12 | Stop reason: HOSPADM

## 2023-03-10 RX ORDER — SODIUM CHLORIDE 0.9 % (FLUSH) 0.9 %
5-40 SYRINGE (ML) INJECTION EVERY 12 HOURS SCHEDULED
Status: DISCONTINUED | OUTPATIENT
Start: 2023-03-10 | End: 2023-03-12 | Stop reason: HOSPADM

## 2023-03-10 RX ORDER — DOCUSATE SODIUM 100 MG/1
100 CAPSULE, LIQUID FILLED ORAL 2 TIMES DAILY
Status: DISCONTINUED | OUTPATIENT
Start: 2023-03-10 | End: 2023-03-12 | Stop reason: HOSPADM

## 2023-03-10 RX ORDER — NIFEDIPINE 30 MG/1
30 TABLET, EXTENDED RELEASE ORAL DAILY
Status: DISCONTINUED | OUTPATIENT
Start: 2023-03-10 | End: 2023-03-12 | Stop reason: HOSPADM

## 2023-03-10 RX ADMIN — ONDANSETRON 4 MG: 2 INJECTION INTRAMUSCULAR; INTRAVENOUS at 08:33

## 2023-03-10 RX ADMIN — Medication 166.7 ML: at 08:53

## 2023-03-10 RX ADMIN — BUPIVACAINE HYDROCHLORIDE 4 ML: 2.5 INJECTION, SOLUTION EPIDURAL; INFILTRATION; INTRACAUDAL at 05:51

## 2023-03-10 RX ADMIN — Medication 25 MCG: at 00:14

## 2023-03-10 RX ADMIN — SODIUM CHLORIDE, POTASSIUM CHLORIDE, SODIUM LACTATE AND CALCIUM CHLORIDE: 600; 310; 30; 20 INJECTION, SOLUTION INTRAVENOUS at 05:30

## 2023-03-10 RX ADMIN — DOCUSATE SODIUM 100 MG: 100 CAPSULE, LIQUID FILLED ORAL at 10:12

## 2023-03-10 RX ADMIN — NIFEDIPINE 30 MG: 30 TABLET, EXTENDED RELEASE ORAL at 09:55

## 2023-03-10 RX ADMIN — IBUPROFEN 600 MG: 600 TABLET, FILM COATED ORAL at 10:12

## 2023-03-10 RX ADMIN — LIDOCAINE HYDROCHLORIDE 4 ML: 20 INJECTION, SOLUTION EPIDURAL; INFILTRATION; INTRACAUDAL; PERINEURAL at 08:32

## 2023-03-10 RX ADMIN — LIDOCAINE HYDROCHLORIDE AND EPINEPHRINE 3 ML: 15; 5 INJECTION, SOLUTION EPIDURAL at 05:47

## 2023-03-10 RX ADMIN — IBUPROFEN 600 MG: 600 TABLET, FILM COATED ORAL at 19:26

## 2023-03-10 RX ADMIN — Medication 1 MILLI-UNITS/MIN: at 07:05

## 2023-03-10 RX ADMIN — BUPIVACAINE HYDROCHLORIDE 4 ML: 2.5 INJECTION, SOLUTION EPIDURAL; INFILTRATION; INTRACAUDAL at 05:56

## 2023-03-10 RX ADMIN — LIDOCAINE HYDROCHLORIDE 5 ML: 20 INJECTION, SOLUTION EPIDURAL; INFILTRATION; INTRACAUDAL; PERINEURAL at 08:28

## 2023-03-10 RX ADMIN — BENZOCAINE AND LEVOMENTHOL: 200; 5 SPRAY TOPICAL at 10:12

## 2023-03-10 RX ADMIN — Medication 87.3 MILLI-UNITS/MIN: at 08:53

## 2023-03-10 RX ADMIN — DOCUSATE SODIUM 100 MG: 100 CAPSULE, LIQUID FILLED ORAL at 20:39

## 2023-03-10 RX ADMIN — Medication 12 ML/HR: at 06:04

## 2023-03-10 RX ADMIN — ACETAMINOPHEN 1000 MG: 500 TABLET ORAL at 23:27

## 2023-03-10 ASSESSMENT — PAIN SCALES - GENERAL
PAINLEVEL_OUTOF10: 3
PAINLEVEL_OUTOF10: 3
PAINLEVEL_OUTOF10: 4

## 2023-03-10 ASSESSMENT — PAIN DESCRIPTION - LOCATION
LOCATION: ABDOMEN

## 2023-03-10 ASSESSMENT — PAIN DESCRIPTION - DESCRIPTORS
DESCRIPTORS: CRAMPING

## 2023-03-10 ASSESSMENT — PAIN - FUNCTIONAL ASSESSMENT
PAIN_FUNCTIONAL_ASSESSMENT: ACTIVITIES ARE NOT PREVENTED

## 2023-03-10 ASSESSMENT — PAIN DESCRIPTION - ORIENTATION
ORIENTATION: MID
ORIENTATION: LOWER
ORIENTATION: MID

## 2023-03-10 NOTE — ANESTHESIA PRE PROCEDURE
Department of Anesthesiology  Preprocedure Note       Name:  Brandyn Sesay   Age:  24 y.o.  :  1995                                          MRN:  [id number]         Date:  3/9/2023      Surgeon: * No surgeons listed *    Procedure: * No procedures listed *    Medications prior to admission:   Prior to Admission medications    Medication Sig Start Date End Date Taking?  Authorizing Provider   NIFEdipine (PROCARDIA XL) 30 MG extended release tablet Take 30 mg by mouth daily   Yes Historical Provider, MD   loratadine (CLARITIN) 10 MG capsule Take 10 mg by mouth daily   Yes Historical Provider, MD   aspirin 81 MG chewable tablet Take 81 mg by mouth daily    Historical Provider, MD   ferrous sulfate (IRON 325) 325 (65 Fe) MG tablet Take 325 mg by mouth daily (with breakfast)    Historical Provider, MD   sertraline (ZOLOFT) 50 MG tablet Take 50 mg by mouth daily    Historical Provider, MD   Prenatal Vit-Fe Fumarate-FA (PRENATAL VITAMINS PO) Take 1 tablet by mouth daily    Historical Provider, MD       Current medications:    Current Facility-Administered Medications   Medication Dose Route Frequency Provider Last Rate Last Admin    lactated ringers IV soln infusion   IntraVENous Continuous Bianka Echevarria MD        lactated ringers bolus  500 mL IntraVENous PRN Bianka Echevarria MD        Or    lactated ringers bolus  1,000 mL IntraVENous PRN Bianka Echevarria MD        sodium chloride flush 0.9 % injection 5-40 mL  5-40 mL IntraVENous 2 times per day Bianka Echevarria MD        sodium chloride flush 0.9 % injection 5-40 mL  5-40 mL IntraVENous PRN Bianka Echevarria MD        0.9 % sodium chloride infusion  25 mL IntraVENous PRN Bianka Echevarria MD        methylergonovine (METHERGINE) injection 200 mcg  200 mcg IntraMUSCular PRN Bianka Echevarria MD        carboprost [facility]) injection 250 mcg  250 mcg IntraMUSCular PRSHANTAL Echevarria MD       [facility] Bring miSOPROStol (CYTOTEC) tablet 800 mcg  800 mcg Rectal PRN Jermaine Dooley MD        tranexamic acid (CYCLOKAPRON) 1000 mg in sodium chloride 0.9 % 50 mL IVPB  1,000 mg IntraVENous Once PRN Jermaine Dooley MD        oxytocin (PITOCIN) 30 units in 500 mL infusion  87.3 roman-units/min IntraVENous Continuous PRN Jermaine Dooley MD        And    oxytocin (PITOCIN) 10 unit bolus from the bag  10 Units IntraVENous PRN Jermaine Dooley MD        ondansetron [facility] injection 4 mg  4 mg IntraVENous Q6H PRN Jermaine Dooley MD        miSOPROStol (CYTOTEC) pre-split tablet TABS 25 mcg  25 mcg Vaginal Q4H Jermaine Dooley MD           Allergies:  No Known Allergies    Problem List:    Patient Active Problem List   Diagnosis Code    Encounter for induction of labor Z34.90    Supervision of high risk pregnancy in third trimester O09.93    Pre-eclampsia in third trimester O14.93    38 weeks gestation of pregnancy Z3A.38    Vaginal delivery O80    Other iron deficiency anemias D50.8    Malabsorption due to intolerance, not elsewhere classified K90.49    Status post induction of labor Z98.890       Past Medical History:        Diagnosis Date    Hypertension     with last pregnancy    Mental disorder     Other iron deficiency anemias 02/22/2023    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      Pre-eclampsia in third trimester 08/17/2021       Past Surgical History:        Procedure Laterality Date    TUMOR REMOVAL Left 1997    age 1 mos    WISDOM TOOTH EXTRACTION  2013       Social History:    Social History     Tobacco Use    Smoking status: Never    Smokeless tobacco: Never   Substance Use Topics    Alcohol use: Not Currently                                Counseling given: Not Answered      Vital Signs (Current):   Vitals:    03/09/23 2003 03/09/23 2018 03/09/23 2034   BP: (!) 142/98 (!) 154/100 (!) 144/92 Pulse: 91 73 66   Resp: 18 18    Temp: 36.9 °C (98.5 °F)     TempSrc: Oral     SpO2: 100%     Weight: 190 lb 3.2 oz (86.3 kg)     Height: 5' 3\" (1.6 m)                                                BP Readings from Last 3 Encounters:   03/09/23 (!) 144/92   02/20/23 124/82   08/19/21 118/73       NPO Status:                                                                                 BMI:   Wt Readings from Last 3 Encounters:   03/09/23 190 lb 3.2 oz (86.3 kg)   02/20/23 186 lb (84.4 kg)   08/17/21 176 lb 12.8 oz (80.2 kg)     Body mass index is 33.69 kg/m². CBC:   Lab Results   Component Value Date/Time    WBC 9.6 03/09/2023 08:10 PM    RBC 4.16 03/09/2023 08:10 PM    HGB 10.5 03/09/2023 08:10 PM    HCT 32.0 03/09/2023 08:10 PM    MCV 77.0 03/09/2023 08:10 PM    RDW 21.9 03/09/2023 08:10 PM     03/09/2023 08:10 PM       CMP:   Lab Results   Component Value Date/Time     03/09/2023 08:10 PM    K 4.4 03/09/2023 08:10 PM     03/09/2023 08:10 PM    CO2 21 03/09/2023 08:10 PM    BUN 12 03/09/2023 08:10 PM    CREATININE <0.5 03/09/2023 08:10 PM    GFRAA >60 08/19/2021 02:15 PM    AGRATIO 1.3 03/09/2023 08:10 PM    LABGLOM >60 03/09/2023 08:10 PM    GLUCOSE 79 03/09/2023 08:10 PM    PROT 5.8 03/09/2023 08:10 PM    CALCIUM 8.4 03/09/2023 08:10 PM    BILITOT <0.2 03/09/2023 08:10 PM    ALKPHOS 131 03/09/2023 08:10 PM    AST 13 03/09/2023 08:10 PM    ALT 8 03/09/2023 08:10 PM       POC Tests: No results for input(s): POCGLU, POCNA, POCK, POCCL, POCBUN, POCHEMO, POCHCT in the last 72 hours.     Coags:   Lab Results   Component Value Date/Time    PROTIME 9.5 08/17/2021 04:40 PM    INR 0.85 08/17/2021 04:40 PM    APTT 26.4 08/17/2021 04:40 PM       HCG (If Applicable): No results found for: PREGTESTUR, PREGSERUM, HCG, HCGQUANT     ABGs: No results found for: PHART, PO2ART, XKO7HJZ, LFP7TOW, BEART, T7NTCOGJ     Type & Screen (If Applicable):  No results found for: LABABO, LABRH    Drug/Infectious Status (If Applicable):  No results found for: HIV, HEPCAB    COVID-19 Screening (If Applicable):   Lab Results   Component Value Date/Time    COVID19 Not Detected 08/17/2021 11:05 PM           Anesthesia Evaluation  Patient summary reviewed no history of anesthetic complications:   Airway: Mallampati: I  TM distance: >3 FB   Neck ROM: full  Mouth opening: > = 3 FB   Dental: normal exam         Pulmonary:Negative Pulmonary ROS and normal exam                               Cardiovascular:  Exercise tolerance: good (>4 METS),   (+) hypertension (with pregnancy):,         Rhythm: regular  Rate: normal           Beta Blocker:  Not on Beta Blocker         Neuro/Psych:   Negative Neuro/Psych ROS              GI/Hepatic/Renal: Neg GI/Hepatic/Renal ROS            Endo/Other: Negative Endo/Other ROS                    Abdominal:             Vascular: negative vascular ROS. Other Findings:           Anesthesia Plan      epidural     ASA 2             Anesthetic plan and risks discussed with patient. Use of blood products discussed with patient whom consented to blood products.                      CHICHO Luo - GIOVANA   3/9/2023

## 2023-03-10 NOTE — FLOWSHEET NOTE
Patient is actively pushing. RN remains in continuous attendance at the bedside. Assessment and evaluation of fetal heart rate ongoing via EFM.

## 2023-03-10 NOTE — FLOWSHEET NOTE
RN at bedside offering breastfeeding help, mom c/o weak latch but no pain r/t poor latch. Reviewed nipple care and prevention of blister and nipple cracks. Hand expressed drops  into infants mouth. Parents questions answered and teachback of latching preformed. Infant had strong and coordinated suck but poor lip flanging. Educated on deeper latch, positioning nose to nipple and extending the bottom lip to help infant open mouth wider. Infant licking nipple and overall tolerating breastfeeding attempt well. Educated proper way to de-latch infant from nipple. Infant very spitty and clear mucus noted on mother's nipple post feed attempts. Bulb suction used. Infant continues to be skin to skin after attempted latch of right side followed with infant sleeping with nipple in mouth. RN educated mom on reverse pressure softening. Mom asked about pumping when she is discharged as she already has a pump at home. Educated on infant feed trends in first 24 hours. Mother agreeable to attempt feeding again in 2 hours if no feeding ques sooner and to continue breastfeeding attempts every 2 hours. Parents state no further questions at this time, infant remains skin to skin on mother's chest, pink, warm, easy resp.

## 2023-03-10 NOTE — FLOWSHEET NOTE
Per order of Dr. Michael Puckett. Dr. Adilia Currie at bedside with confirmation of presentation of vertex with UA.

## 2023-03-10 NOTE — H&P
Department of Obstetrics and Gynecology   Obstetrics History and Physical        CHIEF COMPLAINT:  induction     HISTORY OF PRESENT ILLNESS:    Sanjiv Dye  is a 24 y.o.  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 Pre-eclampsia in third trimester 2021     Past Surgical History:        Procedure Laterality Date    TUMOR REMOVAL Left     age 1 mos    WISDOM TOOTH EXTRACTION       Allergies:  Patient has no known allergies.   Social History:    Social History     Socioeconomic History    Marital status:      Spouse name: Not on file    Number of children: Not on file    Years of education: Not on file    Highest education level: Not on file   Occupational History    Not on file   Tobacco Use    Smoking status: Never    Smokeless tobacco: Never   Vaping Use    Vaping Use: Never used   Substance and Sexual Activity    Alcohol use: Not Currently    Drug use: Never    Sexual activity: Yes     Partners: Male   Other Topics Concern    Not on file   Social History Narrative    Not on file     Social Determinants of Health     Financial Resource Strain: Not on file   Food Insecurity: Not on file   Transportation Needs: Not on file   Physical Activity: Not on file   Stress: Not on file   Social Connections: Not on file   Intimate Partner Violence: Not on file   Housing Stability: Not on file     Family History:       Problem Relation Age of Onset    Obesity Mother     Depression Mother     Cancer Mother     Alcohol Abuse Mother     No Known Problems Father     Obesity Maternal Aunt     Obesity Maternal Grandmother      Medications Prior to Admission:  Medications Prior to Admission: NIFEdipine (PROCARDIA XL) 30 MG extended release tablet, Take 30 mg by mouth daily  loratadine (CLARITIN) 10 MG capsule, Take 10 mg by mouth daily  aspirin 81 MG chewable tablet, Take 81 mg by mouth daily  ferrous sulfate (IRON 325) 325 (65 Fe) MG tablet, Take 325 mg by mouth daily (with breakfast)  sertraline (ZOLOFT) 50 MG tablet, Take 50 mg by mouth daily  Prenatal Vit-Fe Fumarate-FA (PRENATAL VITAMINS PO), Take 1 tablet by mouth daily    REVIEW OF SYSTEMS:  negative     PHYSICAL EXAM:    Vitals:    03/10/23 0754 03/10/23 0759 03/10/23 0803 03/10/23 0804   BP:   139/87    Pulse:   66    Resp:       Temp:       TempSrc:       SpO2: 98% 97%  100%   Weight:       Height:         General appearance:  awake, alert, cooperative, no apparent distress, and appears stated age  Neurologic:  Awake, alert, oriented to name, place and time. Lungs:  No increased work of breathing, good air exchange  Abdomen:  Soft, non tender, gravid, fundal height consistent with the gestational age, EFW by Leopold's maneuvers is 7 lbs. ,  8 oz.   Pelvis:  Adequate pelvis  Cervix: 1cm  Contraction frequency: none  Membranes:  Intact  Labs: CBC:   Lab Results   Component Value Date/Time    WBC 9.6 03/09/2023 08:10 PM    RBC 4.16 03/09/2023 08:10 PM    HGB 10.5 03/09/2023 08:10 PM    HCT 32.0 03/09/2023 08:10 PM    MCV 77.0 03/09/2023 08:10 PM    MCH 25.2 03/09/2023 08:10 PM    MCHC 32.7 03/09/2023 08:10 PM    RDW 21.9 03/09/2023 08:10 PM     03/09/2023 08:10 PM    MPV 8.6 03/09/2023 08:10 PM       ASSESSMENT:  Status post induction of labor    PLAN: plan cytotec for cervical ripening and labor induction, Admit  Labor: Routine labor orders  Fetus: Reassuring  GBS: Negative    I have presented reasonable alternatives to the patient's proposed care, treatment, and services. The discussion I have done encompassed risks, benefits, and side effects related to the alternatives and the risks related to not receiving the proposed care, treatment, and services. All questions answered. Patient wishes to proceed. The surgical site was confirmed by the patient and me.       Electronically signed by Carleen Dave MD on 3/10/2023 at 8:13 AM

## 2023-03-10 NOTE — FLOWSHEET NOTE
Pt c/o of mid groin pain, pt says its not necessarily pressure, Charmaine AKHTAR phoned. Coming to [facility], Dr. Fritz Sullivan updated at nurses station.

## 2023-03-10 NOTE — FLOWSHEET NOTE
Patient admitted to room 2286 for induction of labor. Patient placed on EFM and [employer] central bank monitoring. Consents signed, questions answered, IV started, and patient oriented to binder. Has no further needs at this time will continue to monitor.

## 2023-03-10 NOTE — L&D DELIVERY SUMMARY NOTE
Department of Obstetrics and Gynecology  Spontaneous Vaginal Delivery Note      Pre-operative Diagnosis:  Term pregnancy and Induced labor    Post-operative Diagnosis:  Living  infant(s) and Female    Information for the patient's :  Gerhardt Jobs Girl [place] [[id number]]                  Infant Wt:   Information for the patient's :  Naida Fani [[id number]]         APGARS:     Information for the patient's :  Gerhardt Jobs Girl [place] [[id number]]         Anesthesia:  epidural anesthesia    Application and Delivery:  23 yo  at 38 weeks, presented for IOL at term. Progressed well to complete.  with 5 mintues pushing, small 2nd degree lac. Viable female, skin to skin, delayed cord clamping. Placenta spont intact.  ml. Intake/Output:     Date 23 0701 - 03/10/23 0700 03/10/23 0701 - 23 0700   Shift 4550-2596 5148-4066 24 Hour Total 8142-2697 8190-9971 24 Hour Total   INTAKE   I.V.  1464 1464 190.5  190.5   Shift Total  1464 1464 190.5  190.5   OUTPUT   Urine    50  50   Shift Total    50  50   NET  1464 1464 140.5  140.5       Condition:  infant stable to general nursery and mother stable    Blood Type and Rh: A POS        Rubella Immunity Status:   Unknown           Infant Feeding:    breast    Attending Attestation: I performed the procedure.

## 2023-03-10 NOTE — FLOWSHEET NOTE
Rn called to bedside. Pt states she felt like her water broke. Pt grossly rupture with blood tinged fluid. New underwear placed with iraj pad. Pt up to the bathroom. Moderate size clot passed in toilet. Pt educated on bleeding and to notify RN of reoccurrence.

## 2023-03-10 NOTE — FLOWSHEET NOTE
03/09/23 2100   Fetal Heart Rate   Mode External US   Baseline Rate 140 bpm   Baseline Classification Normal   Variability 6-25 BPM   Pattern Accelerations   Patient Feels Fetal Movement Yes   Interventions RN at Bedside;Ultrasound Adjusted; [place] Adjusted   OB Bladder Status Non-distended   OB Bladder Intervention Voiding with Relief   Fetal Monitoring Strip   FMS Reviewed? Yes   FMS Reviewed By? cs   Uterine Activity   UA Mode Palpation; [place]   Contractions Occasional   Contraction Frequency x6   Contraction Duration    Contraction Intensity Mild   Resting Tone Palpated Soft

## 2023-03-10 NOTE — FLOWSHEET NOTE
03/10/23 0230   Labor Algorithm/Pain Intensity   Labor Algorithm/Pain Intensity Coping   Coping Patient states she is coping   Pain Stated Goal Plans for Epidural   Pain Location Abdomen   Pain Description Cramping   Labor Non-Pharmaceutical Pain Interventions Rest;Repositioning/self-repositioning   Pain Management   Labor Response Talking through contractions   Pain Assessment   Pain Assessment 0-10   Pain Level 2   Patient's Stated Pain Goal 7   Pain Location Abdomen   Pain Orientation Lower   Pain Descriptors Cramping   Functional Pain Assessment Activities are not prevented   Pain Type Acute pain   Pain Frequency Intermittent   Pain Onset Gradual   Non-Pharmaceutical Pain Intervention(s) Rest;Repositioned   Response to Pain Intervention Patient satisfied   Fetal Heart Rate   Mode External US   Baseline Rate 115 bpm   Baseline Classification Normal   Variability 6-25 BPM   Pattern Accelerations   Patient Feels Fetal Movement Yes   Interventions RN at Bedside   OB Bladder Status Non-distended   Fetal Monitoring Strip   FMS Reviewed? Yes   FMS Reviewed By? cs   Uterine Activity   UA Mode [place];Palpation   Contraction Frequency 3-7   Contraction Duration    Contraction Intensity Mild; Moderate   Resting Tone Palpated Soft

## 2023-03-10 NOTE — FLOWSHEET NOTE
Pt request SVE at this time. SVE 2-3/60/-2. Pt denies wanting epidural at this time. No bleeding noted at this time.

## 2023-03-10 NOTE — PLAN OF CARE
Problem: Vaginal Birth or  Section  Goal: Fetal and maternal status remain reassuring during the birth process  Description:  Birth OB-Pregnancy care plan goal which identifies if the fetal and maternal status remain reassuring during the birth process  Outcome: Progressing     Problem: Postpartum  Goal: Experiences normal postpartum course  Description:  Postpartum OB-Pregnancy care plan goal which identifies if the mother is experiencing a normal postpartum course  Outcome: Progressing  Goal: Appropriate maternal -  bonding  Description:  Postpartum OB-Pregnancy care plan goal which identifies if the mother and  are bonding appropriately  Outcome: Progressing  Goal: Establishment of infant feeding pattern  Description:  Postpartum OB-Pregnancy care plan goal which identifies if the mother is establishing a feeding pattern with their   Outcome: Progressing  Goal: Incisions, wounds, or drain sites healing without S/S of infection  Outcome: Progressing     Problem: Infection - Adult  Goal: Absence of infection at discharge  Outcome: Progressing  Goal: Absence of infection during hospitalization  Outcome: Progressing  Goal: Absence of fever/infection during anticipated neutropenic period  Outcome: Progressing     Problem: Safety - Adult  Goal: Free from fall injury  Outcome: Progressing     Problem: Discharge Planning  Goal: Discharge to home or other facility with appropriate resources  Outcome: Progressing     Problem: Chronic Conditions and Co-morbidities  Goal: Patient's chronic conditions and co-morbidity symptoms are monitored and maintained or improved  Outcome: Progressing

## 2023-03-10 NOTE — ANESTHESIA POSTPROCEDURE EVALUATION
Department of Anesthesiology  Postprocedure Note    Patient: Peyton Lind  MRN: [id number]  YOB: 1995  Date of evaluation: 3/10/2023      Procedure Summary     Date: 03/10/23 Room / Location:     Anesthesia Start: 0537 Anesthesia Stop: 0900    Procedure: Labor Analgesia Diagnosis:     Scheduled Providers:  Responsible Provider: Alba Cuellar MD    Anesthesia Type: epidural ASA Status: 2          Anesthesia Type: Epidural    Sloane Phase I: Sloane Score: 10    Sloane Phase II:        Anesthesia Post Evaluation    Patient location during evaluation: bedside  Patient participation: complete - patient participated  Level of consciousness: awake and alert  Pain score: 1  Airway patency: patent  Complications: no  Cardiovascular status: hemodynamically stable  Respiratory status: room air, spontaneous ventilation and acceptable  Multimodal analgesia pain management approach    BP (!) 144/88   Pulse 63   Temp 36.9 °C (98.5 °F) (Oral)   Resp 18   Ht 5' 3\" (1.6 m)   Wt 190 lb 3.2 oz (86.3 kg)   SpO2 (!) 88%   BMI 33.69 kg/m²

## 2023-03-10 NOTE — FLOWSHEET NOTE
I delivered and set up a [employer] Breast pump. I provided supplies necessary for pumping including wash basin, soap, bottle , towels, oral syringes with caps and extra bottles. I educated mother on assembly, use, cleaning, and milk storage guidelines. Encouraged mother to pump every 2-3 hours or at least 8 times in 24 hour period. I observed mother pumping with size 24 mm flanges. Drops were obtained. Mother verbalizes understanding of all information given.

## 2023-03-10 NOTE — PROGRESS NOTES
Called to check presentation. US confirms vertex. US just used to confirm vertex.      NST reactive  Contractions occasional.

## 2023-03-10 NOTE — FLOWSHEET NOTE
Dr. Carleene Osgood notified and updated on pt status and history. Order received for cytotec to be given at midnight and start pitocin at 0400 see MAR.

## 2023-03-10 NOTE — ANESTHESIA PROCEDURE NOTES
Epidural Block    Patient location during procedure: OB  Start time: 3/10/2023 5:37 AM  End time: 3/10/2023 6:06 AM  Reason for block: labor epidural  Staffing  Performed: resident/CRNA   Anesthesiologist: Pee Beck MD  Resident/CRNA: CHICHO Diaz CRNA  Epidural  Patient position: sitting  Prep: ChloraPrep and site prepped and draped  Patient monitoring: continuous pulse ox and frequent blood pressure checks  Approach: midline  Location: L2-3  Injection technique: MARÍA ELENA saline  Provider prep: mask and sterile gloves  Needle  Needle type: Tuohy   Needle gauge: 17 G  Needle length: 3.5 in  Needle insertion depth: 6 cm  Catheter type: side hole  Catheter size: 19 G  Catheter at skin depth: 12 cm  Test dose: negativeCatheter Secured: tegaderm  Assessment  Sensory level: T6  Hemodynamics: stable  Attempts: 1  Outcomes: uncomplicated  Preanesthetic Checklist  Completed: patient identified, IV checked, site marked, risks and benefits discussed, surgical/procedural consents, equipment checked, pre-op evaluation, timeout performed, anesthesia consent given, oxygen available, monitors applied/VS acknowledged, fire risk safety assessment completed and verbalized and blood product R/B/A discussed and consented

## 2023-03-10 NOTE — FLOWSHEET NOTE
Delivery viable infant female. RN remained at bedside throughout 1 [facility] continuously assessed.

## 2023-03-10 NOTE — FLOWSHEET NOTE
of viable female infant at 46. Infant dried and stimulated. Oral suctioning with bulb syringe for large amount pink tinged fluid. Cry noted after a minute of vigorous stimulation and suctioning. HR greater than 100 bpm, Tone WNL, skin color pink at just over a minute of life. Cord clamping delayed, then clamped and cut. Hat and diaper placed. Infant placed skin to skin with mother at 200.

## 2023-03-11 PROCEDURE — 1220000000 HC SEMI PRIVATE OB R&B

## 2023-03-11 PROCEDURE — 6370000000 HC RX 637 (ALT 250 FOR IP): Performed by: OBSTETRICS & GYNECOLOGY

## 2023-03-11 RX ADMIN — NIFEDIPINE 30 MG: 30 TABLET, EXTENDED RELEASE ORAL at 08:39

## 2023-03-11 RX ADMIN — DOCUSATE SODIUM 100 MG: 100 CAPSULE, LIQUID FILLED ORAL at 21:07

## 2023-03-11 RX ADMIN — IBUPROFEN 600 MG: 600 TABLET, FILM COATED ORAL at 18:46

## 2023-03-11 RX ADMIN — DOCUSATE SODIUM 100 MG: 100 CAPSULE, LIQUID FILLED ORAL at 08:39

## 2023-03-11 ASSESSMENT — PAIN SCALES - GENERAL: PAINLEVEL_OUTOF10: 3

## 2023-03-11 NOTE — FLOWSHEET NOTE
Pt using hand pump. Pt states she has electric pump in [facility] but prefers to use hand pump.

## 2023-03-11 NOTE — ANESTHESIA POSTPROCEDURE EVALUATION
Department of Anesthesiology  Postprocedure Note    Patient: Lacho Stanford  MRN: [id number]  YOB: 1995  Date of evaluation: 3/11/2023      Procedure Summary     Date: 03/10/23 Room / Location:     Anesthesia Start: 0537 Anesthesia Stop: 0900    Procedure: Labor Analgesia Diagnosis:     Scheduled Providers:  Responsible Provider: Leopoldo Sacramento, MD    Anesthesia Type: epidural ASA Status: 2          Anesthesia Type: No value filed. Sloane Phase I: Sloane Score: 10    Sloane Phase II: Sloane Score: 10      Anesthesia Post Evaluation    Patient location during evaluation: floor  Patient participation: complete - patient participated  Level of consciousness: awake and alert  Pain score: 2  Airway patency: patent  Nausea & Vomiting: no vomiting and no nausea  Complications: no  Cardiovascular status: hemodynamically stable  Respiratory status: room air, spontaneous ventilation and acceptable  Hydration status: stable  Comments: States epidural worked until pitocin was started. Was uncomfortable through delivery. Received a dose of Lidocaine 2% during pushing. The dosed did help for repair.

## 2023-03-11 NOTE — DISCHARGE SUMMARY
Department of Obstetrics and Gynecology  Postpartum Discharge Summary      Admit Date: 3/9/2023    Admit Diagnosis: Status post induction of labor [Z98.890]    Discharge Date:   Any delay in discharge from ordered D/C date due to  factors. Discharge Diagnoses: spontaneous vaginal delivery       Medication List        CONTINUE taking these medications      Claritin 10 MG capsule  Generic drug: loratadine     ferrous sulfate 325 (65 Fe) MG tablet  Commonly known as: IRON 325     PRENATAL VITAMINS PO     Procardia XL 30 MG extended release tablet  Generic drug: NIFEdipine     sertraline 50 MG tablet  Commonly known as: ZOLOFT            STOP taking these medications      aspirin 81 MG chewable tablet              Service: Obstetrics    Consults: none    Significant Diagnostic Studies: none    Postpartum complications: none     Condition at Discharge: good    Hospital Course: uncomplicated    Discharge Instructions: Activity: as tolerated    Diet: regular diet    Instructions: No intercourse and nothing in the vagina for 6 weeks. Do not drive while using pain medications.  Keep any wounds clean and dry    Discharge to: Home    Disposition / Follow up: Return to office in 6 weeks    Home Health Nurse visit within 24-48 h if qualifies     Data:  Apgars:  Information for the patient's :  Jewels Casas [[id number]]   APGAR One: 6   Information for the patient's :  Jewels Casas [[id number]]   APGAR Five: 9   Birth Weight:  Information for the patient's :  Jewels Casas [[id number]]   Birth Weight: 6 lb 9.5 oz (2.99 kg)   Home with mother    Electronically signed by Carleen Dave MD on 3/11/2023 at 7:06 AM

## 2023-03-11 NOTE — DISCHARGE INSTRUCTIONS
Department of Obstetrics and Gynecology  Vaginal Delivery Postpartum Discharge instructions    You will need a postpartum visit in the clinic 6 weeks after your delivery. Please call office [phone] to schedule an appointment:      Please call the office or the OB/GYN on-call if after-hours for any of the followin) Fever - a temperature greater than 100.4  2) Uncontrolled pain  3) Uncontrolled bleeding (soaking more than 1 pad in an hour)  4) Foul-smelling discharge from the vagina    Do not place anything in the vagina - this includes tampons, douches or having sex - until after your 6 week postpartum visit to prevent infection. Medications:   acetaminophen (TYLENOL) tablet 1,000 mg    Admin Instructions:    Give in addition to any other pain medication ordered at same time for any pain indication. Maximum dose of acetaminophen is 4000mg from all sources in 24 hours. Alternate ibuprofen and acetaminophen every 4 hours. 2 tablet (2 × 500 mg tablet) Oral EVERY 8 HOURS PRN 03/10/23 [id number]    ibuprofen (ADVIL;MOTRIN) tablet 600 mg    Admin Instructions:    Give in addition to any other pain medication ordered at same time for any pain indication. Once tolerating PO, discontinue Toradol and begin ibuprofen 8 hours after the final dose of Toradol. Alternate ibuprofen and acetaminophen every 4 hours.  Postpartum    1 tablet (1 × 600 mg tablet) Oral EVERY 8 HOURS PRN 23 2365

## 2023-03-11 NOTE — PLAN OF CARE
Problem: Postpartum  Goal: Experiences normal postpartum course  Description:  Postpartum OB-Pregnancy care plan goal which identifies if the mother is experiencing a normal postpartum course  3/11/2023 1709 by Pricilla Joe RN  Outcome: Progressing  3/11/2023 1709 by Pricilla Joe RN  Outcome: Progressing  Goal: Appropriate maternal -  bonding  Description:  Postpartum OB-Pregnancy care plan goal which identifies if the mother and  are bonding appropriately  3/11/2023 1709 by Pricilla Joe RN  Outcome: Progressing  3/11/2023 1709 by Pricilla Joe RN  Outcome: Progressing  Goal: Establishment of infant feeding pattern  Description:  Postpartum OB-Pregnancy care plan goal which identifies if the mother is establishing a feeding pattern with their   3/11/2023 1709 by Pricilla Joe RN  Outcome: Progressing  3/11/2023 1709 by Pricilla Joe RN  Outcome: Progressing  Goal: Incisions, wounds, or drain sites healing without S/S of infection  3/11/2023 1709 by Pricilla Joe RN  Outcome: Progressing  3/11/2023 1709 by Pricilla Joe RN  Outcome: Progressing  Flowsheets (Taken 3/11/2023 0900 by Valeriy Ryan RN)  Incisions, Wounds, or Drain Sites Healing Without Sign and Symptoms of Infection: ADMISSION and DAILY: Assess and document risk factors for pressure ulcer development     Problem: Infection - Adult  Goal: Absence of infection at discharge  3/11/2023 1709 by Pricilla Joe RN  Outcome: Progressing  3/11/2023 1709 by Pricilla Joe RN  Outcome: Progressing  Goal: Absence of infection during hospitalization  3/11/2023 1709 by Pricilla Joe RN  Outcome: Progressing  3/11/2023 1709 by Pricilla Joe RN  Outcome: Progressing  Goal: Absence of fever/infection during anticipated neutropenic period  3/11/2023 1709 by Pricilla Joe RN  Outcome: Progressing  3/11/2023 1709 by Pricilla Joe RN  Outcome: Progressing     Problem: Safety - Adult  Goal: Free from fall injury  3/11/2023 1709 by Pricilla Joe RN  Outcome: Progressing  3/11/2023 1709 by Maria Elena Holder RN  Outcome: Progressing     Problem: Discharge Planning  Goal: Discharge to home or other facility with appropriate resources  3/11/2023 1709 by Maria Elena Holder RN  Outcome: Progressing  3/11/2023 1709 by Maria Elena Holder RN  Outcome: Progressing     Problem: Chronic Conditions and Co-morbidities  Goal: Patient's chronic conditions and co-morbidity symptoms are monitored and maintained or improved  3/11/2023 1709 by Maria Elena Holder RN  Outcome: Progressing  3/11/2023 1709 by Maria Elena Holder RN  Outcome: Progressing

## 2023-03-11 NOTE — PROGRESS NOTES
Department of Obstetrics and Gynecology  Vaginal Delivery Postpartum Rounds    SUBJECTIVE:  Pain is controlled with Tylenol or non-steroidal anti-inflammatory drugs. Her lochia is normal.    OBJECTIVE:  Vital Signs: /89   Pulse 74   Temp 98.3 °F (36.8 °C) (Oral)   Resp 18   Ht 5' 3\" (1.6 m)   Wt 190 lb 3.2 oz (86.3 kg)   SpO2 98%   Breastfeeding Unknown   BMI 33.69 kg/m²   Appearance/Psychiatric: awake, alert, cooperative, no apparent distress, appears stated age  Constitutional: The patient is well nourished. Cardiovascular: She does not have edema. Respiratory: Respiratory effort is normal.  Gastrointestinal: Soft, non tender, uterine fundus is firm below umbilicus  Extremities: nontender to palpation  Perineum: intact     LABS / IMAGING:      Lab Results   Component Value Date/Time    WBC 9.6 2023 08:10 PM    RBC 4.16 2023 08:10 PM    HGB 10.5 2023 08:10 PM    HCT 32.0 2023 08:10 PM    MCV 77.0 2023 08:10 PM    MCH 25.2 2023 08:10 PM    MCHC 32.7 2023 08:10 PM    RDW 21.9 2023 08:10 PM     2023 08:10 PM    MPV 8.6 2023 08:10 PM     Lab Results   Component Value Date/Time     2023 08:10 PM    K 4.4 2023 08:10 PM     2023 08:10 PM    CO2 21 2023 08:10 PM    BUN 12 2023 08:10 PM    CREATININE <0.5 2023 08:10 PM    GLUCOSE 79 2023 08:10 PM    CALCIUM 8.4 2023 08:10 PM     No results found for: POCGLU  Lab Results   Component Value Date/Time    ALKPHOS 131 2023 08:10 PM    ALT 8 2023 08:10 PM    AST 13 2023 08:10 PM    PROT 5.8 2023 08:10 PM    BILITOT <0.2 2023 08:10 PM    BILIDIR <0.2 2021 02:15 PM    LABALBU 3.3 2023 08:10 PM         ASSESSMENT:    Postpartum Day 1 s/p , preeclampsia    PLAN:   1. Continue routine postpartum care   2. Discharge home on Postpartum Day 1  3. Return to office in 6 weeks   4.  Postpartum instructions reviewed and all patient's Questions answered    Electronically signed by Renetta Foreman MD on 3/11/2023 at 7:03 AM

## 2023-03-12 VITALS
OXYGEN SATURATION: 100 % | HEART RATE: 99 BPM | DIASTOLIC BLOOD PRESSURE: 77 MMHG | RESPIRATION RATE: 16 BRPM | BODY MASS INDEX: 33.7 KG/M2 | WEIGHT: 190.2 LBS | TEMPERATURE: 97.8 F | SYSTOLIC BLOOD PRESSURE: 146 MMHG | HEIGHT: 63 IN

## 2023-03-12 PROCEDURE — 90707 MMR VACCINE SC: CPT | Performed by: OBSTETRICS & GYNECOLOGY

## 2023-03-12 PROCEDURE — 6370000000 HC RX 637 (ALT 250 FOR IP): Performed by: OBSTETRICS & GYNECOLOGY

## 2023-03-12 PROCEDURE — 90471 IMMUNIZATION ADMIN: CPT | Performed by: OBSTETRICS & GYNECOLOGY

## 2023-03-12 PROCEDURE — 6360000002 HC RX W HCPCS: Performed by: OBSTETRICS & GYNECOLOGY

## 2023-03-12 RX ADMIN — MEASLES, MUMPS, AND RUBELLA VIRUS VACCINE LIVE 0.5 ML: 1000; 12500; 1000 INJECTION, POWDER, LYOPHILIZED, FOR SUSPENSION SUBCUTANEOUS at 10:41

## 2023-03-12 RX ADMIN — IBUPROFEN 600 MG: 600 TABLET, FILM COATED ORAL at 03:51

## 2023-03-12 RX ADMIN — DOCUSATE SODIUM 100 MG: 100 CAPSULE, LIQUID FILLED ORAL at 08:46

## 2023-03-12 RX ADMIN — NIFEDIPINE 30 MG: 30 TABLET, EXTENDED RELEASE ORAL at 08:46

## 2023-03-12 ASSESSMENT — PAIN DESCRIPTION - DESCRIPTORS: DESCRIPTORS: CRAMPING

## 2023-03-12 ASSESSMENT — PAIN SCALES - GENERAL: PAINLEVEL_OUTOF10: 3

## 2023-03-12 ASSESSMENT — PAIN DESCRIPTION - LOCATION: LOCATION: ABDOMEN

## 2023-03-12 NOTE — PLAN OF CARE
Problem: Vaginal Birth or  Section  Goal: Fetal and maternal status remain reassuring during the birth process  Description:  Birth OB-Pregnancy care plan goal which identifies if the fetal and maternal status remain reassuring during the birth process  3/11/2023 1709 by Blaze Rosa RN  Outcome: Completed  3/11/2023 1709 by Blaze Rosa RN  Outcome: Progressing     Problem: Postpartum  Goal: Experiences normal postpartum course  Description:  Postpartum OB-Pregnancy care plan goal which identifies if the mother is experiencing a normal postpartum course  3/12/2023 0420 by Baron Enrike RN  Outcome: Progressing  3/11/2023 1709 by Blaze Rosa RN  Outcome: Progressing  3/11/2023 1709 by Blaze Rosa RN  Outcome: Progressing  Goal: Appropriate maternal -  bonding  Description:  Postpartum OB-Pregnancy care plan goal which identifies if the mother and  are bonding appropriately  3/12/2023 0420 by Baron Enrike RN  Outcome: Progressing  3/11/2023 1709 by Blaze Rosa RN  Outcome: Progressing  3/11/2023 1709 by Blaze Rosa RN  Outcome: Progressing  Goal: Establishment of infant feeding pattern  Description:  Postpartum OB-Pregnancy care plan goal which identifies if the mother is establishing a feeding pattern with their   3/12/2023 0420 by Baron Enrike RN  Outcome: Progressing  3/11/2023 1709 by Blaze Rosa RN  Outcome: Progressing  3/11/2023 1709 by Blaze Rosa RN  Outcome: Progressing  Goal: Incisions, wounds, or drain sites healing without S/S of infection  3/12/2023 0420 by Baron Enrike RN  Outcome: Progressing  3/11/2023 1709 by Blaze Rosa RN  Outcome: Progressing  3/11/2023 1709 by Blaze Rosa RN  Outcome: Progressing  Flowsheets (Taken 3/11/2023 0900 by Ria Moran RN)  Incisions, Wounds, or Drain Sites Healing Without Sign and Symptoms of Infection: ADMISSION and DAILY: Assess and document risk factors for pressure ulcer development     Problem: Infection - Adult  Goal: Absence of infection at discharge  3/12/2023 0420 by Nhan Trevino RN  Outcome: Progressing  3/11/2023 1709 by Agustin Gomez RN  Outcome: Progressing  3/11/2023 1709 by Agustin Gomez RN  Outcome: Progressing  Goal: Absence of infection during hospitalization  3/12/2023 0420 by Nhan Trevino RN  Outcome: Progressing  3/11/2023 1709 by Agustin Gomez RN  Outcome: Progressing  3/11/2023 1709 by Agustin Gomez RN  Outcome: Progressing  Goal: Absence of fever/infection during anticipated neutropenic period  3/12/2023 0420 by Nhan Trevino RN  Outcome: Progressing  3/11/2023 1709 by Agustin Gomez RN  Outcome: Progressing  3/11/2023 1709 by Agustin Gomez RN  Outcome: Progressing     Problem: Safety - Adult  Goal: Free from fall injury  3/12/2023 0420 by Nhan Trevino RN  Outcome: Progressing  3/11/2023 1709 by Agustin Gomez RN  Outcome: Progressing  3/11/2023 1709 by Agustin Gomez RN  Outcome: Progressing     Problem: Discharge Planning  Goal: Discharge to home or other facility with appropriate resources  3/12/2023 0420 by Nhan Trevino RN  Outcome: Progressing  3/11/2023 1709 by Agustin Gomez RN  Outcome: Progressing  3/11/2023 1709 by Agustin Gomez RN  Outcome: Progressing     Problem: Chronic Conditions and Co-morbidities  Goal: Patient's chronic conditions and co-morbidity symptoms are monitored and maintained or improved  3/12/2023 0420 by Nhan Trevino RN  Outcome: Progressing  3/11/2023 1709 by Agustin Gomez RN  Outcome: Progressing  3/11/2023 1709 by Agustin Gomez RN  Outcome: Progressing

## 2023-03-12 NOTE — FLOWSHEET NOTE
Postpartum and infant care teaching completed and forms signed by patient. Copy witnessed by RN and given to patient. Patient verbalized understanding of all teaching points. Patient plans to follow-up with [facility] Provider as instructed. Patient verbalizes understanding of discharge instructions and denies further questions. ID bands checked. Mother's ID band and one of baby's ID bands removed and taped to footprint sheet, signed by patient and witnessed by RN. Patient discharged in stable condition accompanied by family. Gathering belongings now, will call out when ready for discharge home.

## 2023-03-12 NOTE — PLAN OF CARE
Problem: Postpartum  Goal: Experiences normal postpartum course  Description:  Postpartum OB-Pregnancy care plan goal which identifies if the mother is experiencing a normal postpartum course  3/12/2023 0855 by Kristi Magallon RN  Outcome: Completed  3/12/2023 0420 by Anne Briseno RN  Outcome: Progressing  Goal: Appropriate maternal -  bonding  Description:  Postpartum OB-Pregnancy care plan goal which identifies if the mother and  are bonding appropriately  3/12/2023 0855 by Kristi Magallon RN  Outcome: Completed  3/12/2023 0420 by Anne Briseno RN  Outcome: Progressing  Goal: Establishment of infant feeding pattern  Description:  Postpartum OB-Pregnancy care plan goal which identifies if the mother is establishing a feeding pattern with their   3/12/2023 0855 by Kristi Magallon RN  Outcome: Completed  3/12/2023 0420 by Anne Briseno RN  Outcome: Progressing  Goal: Incisions, wounds, or drain sites healing without S/S of infection  3/12/2023 0855 by Kristi Magallon RN  Outcome: Completed  3/12/2023 0420 by Anne Briseno RN  Outcome: Progressing     Problem: Infection - Adult  Goal: Absence of infection at discharge  3/12/2023 0855 by Kristi Magallon RN  Outcome: Completed  3/12/2023 0420 by Anne Briseno RN  Outcome: Progressing  Goal: Absence of infection during hospitalization  3/12/2023 0855 by Kristi Magallon RN  Outcome: Completed  3/12/2023 0420 by Anne Briseno RN  Outcome: Progressing  Goal: Absence of fever/infection during anticipated neutropenic period  3/12/2023 0855 by Kristi Magallon RN  Outcome: Completed  3/12/2023 0420 by Anne Briseno RN  Outcome: Progressing     Problem: Safety - Adult  Goal: Free from fall injury  3/12/2023 0855 by Kristi Magallon RN  Outcome: Completed  3/12/2023 0420 by Anne Briseno RN  Outcome: Progressing     Problem: Discharge Planning  Goal: Discharge to home or other facility with appropriate resources  3/12/2023 0855 by Meagan Torres RN  Outcome: Completed  3/12/2023 0420 by Berhane Boyce RN  Outcome: Progressing     Problem: Chronic Conditions and Co-morbidities  Goal: Patient's chronic conditions and co-morbidity symptoms are monitored and maintained or improved  3/12/2023 0855 by Meagan Torres RN  Outcome: Completed  3/12/2023 0420 by Berhane Boyce RN  Outcome: Progressing

## 2023-03-12 NOTE — FLOWSHEET NOTE
Patient unsure if she received the t dap vaccine. She thinks she did, she always take whatever is recommended. she plans to call the office to verify. Today is Sunday and the office is closed.

## 2024-02-08 ENCOUNTER — OFFICE VISIT (OUTPATIENT)
Age: 29
End: 2024-02-08

## 2024-02-08 VITALS
HEIGHT: 63 IN | DIASTOLIC BLOOD PRESSURE: 83 MMHG | BODY MASS INDEX: 27.18 KG/M2 | OXYGEN SATURATION: 98 % | SYSTOLIC BLOOD PRESSURE: 130 MMHG | HEART RATE: 66 BPM | TEMPERATURE: 98.3 F | WEIGHT: 153.4 LBS

## 2024-02-08 DIAGNOSIS — S05.02XA ABRASION OF LEFT CORNEA, INITIAL ENCOUNTER: Primary | ICD-10-CM

## 2024-02-08 RX ORDER — NEOMYCIN POLYMYXIN B SULFATES AND DEXAMETHASONE 3.5; 10000; 1 MG/ML; [USP'U]/ML; MG/ML
2 SUSPENSION/ DROPS OPHTHALMIC 3 TIMES DAILY
Qty: 3 ML | Refills: 0 | Status: SHIPPED | OUTPATIENT
Start: 2024-02-08 | End: 2024-02-18

## 2024-02-08 ASSESSMENT — ENCOUNTER SYMPTOMS
EYE REDNESS: 1
EYE PAIN: 1

## 2024-02-08 NOTE — PATIENT INSTRUCTIONS
Thank you for allowing us to care for you today and we hope you feel better soon  Tylenol/Motrin as needed for fever and discomfort  New Prescriptions    NEOMYCIN-POLYMYXIN-DEXAMETHASONE (MAXITROL) 0.1 % OPHTHALMIC SUSPENSION    Place 2 drops into the left eye 3 times daily for 10 days
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
